# Patient Record
Sex: FEMALE | Race: WHITE | URBAN - METROPOLITAN AREA
[De-identification: names, ages, dates, MRNs, and addresses within clinical notes are randomized per-mention and may not be internally consistent; named-entity substitution may affect disease eponyms.]

---

## 2022-07-29 ENCOUNTER — OFFICE VISIT (OUTPATIENT)
Dept: FAMILY MEDICINE CLINIC | Age: 27
End: 2022-07-29
Payer: COMMERCIAL

## 2022-07-29 ENCOUNTER — TELEPHONE (OUTPATIENT)
Dept: FAMILY MEDICINE CLINIC | Age: 27
End: 2022-07-29

## 2022-07-29 VITALS
HEIGHT: 62 IN | OXYGEN SATURATION: 99 % | HEART RATE: 84 BPM | WEIGHT: 207 LBS | BODY MASS INDEX: 38.09 KG/M2 | SYSTOLIC BLOOD PRESSURE: 110 MMHG | DIASTOLIC BLOOD PRESSURE: 70 MMHG

## 2022-07-29 DIAGNOSIS — Z76.89 ENCOUNTER TO ESTABLISH CARE: Primary | ICD-10-CM

## 2022-07-29 DIAGNOSIS — F41.1 GAD (GENERALIZED ANXIETY DISORDER): ICD-10-CM

## 2022-07-29 DIAGNOSIS — E66.9 CLASS 2 OBESITY WITHOUT SERIOUS COMORBIDITY WITH BODY MASS INDEX (BMI) OF 38.0 TO 38.9 IN ADULT, UNSPECIFIED OBESITY TYPE: ICD-10-CM

## 2022-07-29 DIAGNOSIS — J45.990 EXERCISE-INDUCED ASTHMA: ICD-10-CM

## 2022-07-29 PROBLEM — E66.812 CLASS 2 OBESITY WITHOUT SERIOUS COMORBIDITY WITH BODY MASS INDEX (BMI) OF 38.0 TO 38.9 IN ADULT: Status: ACTIVE | Noted: 2022-07-29

## 2022-07-29 PROCEDURE — 1036F TOBACCO NON-USER: CPT | Performed by: PHYSICIAN ASSISTANT

## 2022-07-29 PROCEDURE — 99203 OFFICE O/P NEW LOW 30 MIN: CPT | Performed by: PHYSICIAN ASSISTANT

## 2022-07-29 PROCEDURE — G8427 DOCREV CUR MEDS BY ELIG CLIN: HCPCS | Performed by: PHYSICIAN ASSISTANT

## 2022-07-29 PROCEDURE — G8419 CALC BMI OUT NRM PARAM NOF/U: HCPCS | Performed by: PHYSICIAN ASSISTANT

## 2022-07-29 RX ORDER — PANTOPRAZOLE SODIUM 20 MG/1
20 TABLET, DELAYED RELEASE ORAL DAILY
COMMUNITY

## 2022-07-29 RX ORDER — NORETHINDRONE ACETATE AND ETHINYL ESTRADIOL AND FERROUS FUMARATE 1MG-20(24)
KIT ORAL
COMMUNITY

## 2022-07-29 RX ORDER — CITALOPRAM 10 MG/1
10 TABLET ORAL DAILY
Qty: 30 TABLET | Refills: 5 | Status: SHIPPED | OUTPATIENT
Start: 2022-07-29 | End: 2022-09-15

## 2022-07-29 SDOH — ECONOMIC STABILITY: FOOD INSECURITY: WITHIN THE PAST 12 MONTHS, THE FOOD YOU BOUGHT JUST DIDN'T LAST AND YOU DIDN'T HAVE MONEY TO GET MORE.: NEVER TRUE

## 2022-07-29 SDOH — ECONOMIC STABILITY: HOUSING INSECURITY
IN THE LAST 12 MONTHS, WAS THERE A TIME WHEN YOU DID NOT HAVE A STEADY PLACE TO SLEEP OR SLEPT IN A SHELTER (INCLUDING NOW)?: NO

## 2022-07-29 SDOH — ECONOMIC STABILITY: HOUSING INSECURITY: IN THE LAST 12 MONTHS, HOW MANY PLACES HAVE YOU LIVED?: 0

## 2022-07-29 SDOH — ECONOMIC STABILITY: TRANSPORTATION INSECURITY
IN THE PAST 12 MONTHS, HAS THE LACK OF TRANSPORTATION KEPT YOU FROM MEDICAL APPOINTMENTS OR FROM GETTING MEDICATIONS?: NO

## 2022-07-29 SDOH — ECONOMIC STABILITY: FOOD INSECURITY: WITHIN THE PAST 12 MONTHS, YOU WORRIED THAT YOUR FOOD WOULD RUN OUT BEFORE YOU GOT MONEY TO BUY MORE.: NEVER TRUE

## 2022-07-29 SDOH — ECONOMIC STABILITY: TRANSPORTATION INSECURITY
IN THE PAST 12 MONTHS, HAS LACK OF TRANSPORTATION KEPT YOU FROM MEETINGS, WORK, OR FROM GETTING THINGS NEEDED FOR DAILY LIVING?: NO

## 2022-07-29 SDOH — ECONOMIC STABILITY: INCOME INSECURITY: IN THE LAST 12 MONTHS, WAS THERE A TIME WHEN YOU WERE NOT ABLE TO PAY THE MORTGAGE OR RENT ON TIME?: NO

## 2022-07-29 ASSESSMENT — PATIENT HEALTH QUESTIONNAIRE - PHQ9
4. FEELING TIRED OR HAVING LITTLE ENERGY: 0
7. TROUBLE CONCENTRATING ON THINGS, SUCH AS READING THE NEWSPAPER OR WATCHING TELEVISION: 0
SUM OF ALL RESPONSES TO PHQ QUESTIONS 1-9: 0
3. TROUBLE FALLING OR STAYING ASLEEP: 0
SUM OF ALL RESPONSES TO PHQ QUESTIONS 1-9: 0
DEPRESSION UNABLE TO ASSESS: FUNCTIONAL CAPACITY MOTIVATION LIMITS ACCURACY
SUM OF ALL RESPONSES TO PHQ9 QUESTIONS 1 & 2: 0
8. MOVING OR SPEAKING SO SLOWLY THAT OTHER PEOPLE COULD HAVE NOTICED. OR THE OPPOSITE, BEING SO FIGETY OR RESTLESS THAT YOU HAVE BEEN MOVING AROUND A LOT MORE THAN USUAL: 0
2. FEELING DOWN, DEPRESSED OR HOPELESS: 0
SUM OF ALL RESPONSES TO PHQ QUESTIONS 1-9: 0
9. THOUGHTS THAT YOU WOULD BE BETTER OFF DEAD, OR OF HURTING YOURSELF: 0
SUM OF ALL RESPONSES TO PHQ QUESTIONS 1-9: 0
1. LITTLE INTEREST OR PLEASURE IN DOING THINGS: 0
10. IF YOU CHECKED OFF ANY PROBLEMS, HOW DIFFICULT HAVE THESE PROBLEMS MADE IT FOR YOU TO DO YOUR WORK, TAKE CARE OF THINGS AT HOME, OR GET ALONG WITH OTHER PEOPLE: 0
5. POOR APPETITE OR OVEREATING: 0
6. FEELING BAD ABOUT YOURSELF - OR THAT YOU ARE A FAILURE OR HAVE LET YOURSELF OR YOUR FAMILY DOWN: 0

## 2022-07-29 ASSESSMENT — ENCOUNTER SYMPTOMS
CONSTIPATION: 0
WHEEZING: 0
COUGH: 0
NAUSEA: 0
BLOOD IN STOOL: 0
DIARRHEA: 0
SHORTNESS OF BREATH: 0
ABDOMINAL PAIN: 0

## 2022-07-29 ASSESSMENT — SOCIAL DETERMINANTS OF HEALTH (SDOH): HOW HARD IS IT FOR YOU TO PAY FOR THE VERY BASICS LIKE FOOD, HOUSING, MEDICAL CARE, AND HEATING?: NOT HARD AT ALL

## 2022-07-29 NOTE — PROGRESS NOTES
Georges Iqbal (:  1995) is a 27 y.o. female,New patient, here for evaluation of the following chief complaint(s):  New Patient and Anxiety         ASSESSMENT/PLAN:  1. Encounter to establish care  2. SILVINA (generalized anxiety disorder)  -     citalopram (CELEXA) 10 MG tablet; Take 1 tablet by mouth in the morning., Disp-30 tablet, R-5Normal  -    uncontrolled, medication risks, benefits and side effects discussed with patient. She is interested in following up with psychology and will get established with Dr. Brianda Fong. Return for New Pt appt with Dr. Brianda Fong, next appointment with me in 4 weeks. Subjective   SUBJECTIVE/OBJECTIVE:  HPI  Anxiety:  Current medication: none  Has previously been on zoloft which made her vomit and one other medication that she doesn't remember  Symptoms are acute on chronic, worsening over the last four years  Current symptoms: anxiety  Denies: panic attacks, insomnia, dysphoric mood, irritability. SI/HI  Denies any past hospitalization  Has never seen a therapist    PAP: last month, [facility]  Lab work completed there as well    Exercise induced asthma  Has not needed an inhaler in over a year but does have one on hand    Review of Systems   Constitutional:  Negative for diaphoresis, fatigue and unexpected weight change. Respiratory:  Negative for cough, shortness of breath and wheezing. Cardiovascular:  Negative for chest pain and palpitations. Gastrointestinal:  Negative for abdominal pain, blood in stool, constipation, diarrhea and nausea. Genitourinary:  Negative for hematuria and menstrual problem. Neurological:  Negative for dizziness, light-headedness and headaches. Psychiatric/Behavioral:  Negative for agitation, behavioral problems, confusion, decreased concentration, dysphoric mood, hallucinations, self-injury, sleep disturbance and suicidal ideas. The patient is nervous/anxious. The patient is not hyperactive. Objective   Physical Exam  Vitals reviewed. Constitutional:       Appearance: Normal appearance. She is obese. HENT:      Head: Normocephalic and atraumatic. Cardiovascular:      Rate and Rhythm: Normal rate and regular rhythm. Heart sounds: Normal heart sounds. Pulmonary:      Effort: Pulmonary effort is normal.      Breath sounds: Normal breath sounds. Abdominal:      General: Bowel sounds are normal.      Palpations: Abdomen is soft. Tenderness: There is no abdominal tenderness. Musculoskeletal:      Right lower leg: No edema. Left lower leg: No edema. Neurological:      Mental Status: She is alert and oriented to person, place, and time. Cranial Nerves: No cranial nerve deficit. Psychiatric:         Attention and Perception: Attention and perception normal.         Mood and Affect: Affect normal. Mood is anxious. Speech: Speech normal.         Behavior: Behavior normal. Behavior is cooperative. Thought Content: Thought content normal.         Cognition and Memory: Cognition and memory normal.         Judgment: Judgment normal.                An electronic signature was used to authenticate this note.     --MEGHAN Ferrell

## 2022-07-29 NOTE — TELEPHONE ENCOUNTER
Yes, they were supposed to schedule this before she left, please get her scheduled with Dr. Nicole Dugan

## 2022-07-29 NOTE — TELEPHONE ENCOUNTER
----- Message from Pau Griggs sent at 7/29/2022 11:39 AM EDT -----  Subject: Message to Provider    QUESTIONS  Information for Provider? Patient would like the phone number/information   to schedule a phycologist appointment. She stated she just left the   office.  Please follow up.  ---------------------------------------------------------------------------  --------------  Yumi ALEXANDER  [phone]; OK to leave message on voicemail  ---------------------------------------------------------------------------  --------------  SCRIPT ANSWERS  undefined

## 2022-09-15 ENCOUNTER — OFFICE VISIT (OUTPATIENT)
Dept: FAMILY MEDICINE CLINIC | Age: 27
End: 2022-09-15
Payer: COMMERCIAL

## 2022-09-15 VITALS
TEMPERATURE: 99 F | SYSTOLIC BLOOD PRESSURE: 124 MMHG | OXYGEN SATURATION: 98 % | HEART RATE: 78 BPM | WEIGHT: 211 LBS | DIASTOLIC BLOOD PRESSURE: 76 MMHG | BODY MASS INDEX: 38.83 KG/M2 | HEIGHT: 62 IN

## 2022-09-15 DIAGNOSIS — F41.1 GAD (GENERALIZED ANXIETY DISORDER): Primary | ICD-10-CM

## 2022-09-15 PROCEDURE — G8417 CALC BMI ABV UP PARAM F/U: HCPCS | Performed by: PHYSICIAN ASSISTANT

## 2022-09-15 PROCEDURE — 1036F TOBACCO NON-USER: CPT | Performed by: PHYSICIAN ASSISTANT

## 2022-09-15 PROCEDURE — G8427 DOCREV CUR MEDS BY ELIG CLIN: HCPCS | Performed by: PHYSICIAN ASSISTANT

## 2022-09-15 PROCEDURE — 99213 OFFICE O/P EST LOW 20 MIN: CPT | Performed by: PHYSICIAN ASSISTANT

## 2022-09-15 RX ORDER — CITALOPRAM 20 MG/1
20 TABLET ORAL DAILY
Qty: 90 TABLET | Refills: 1 | Status: SHIPPED | OUTPATIENT
Start: 2022-09-15

## 2022-09-15 RX ORDER — BUSPIRONE HYDROCHLORIDE 10 MG/1
10 TABLET ORAL 2 TIMES DAILY PRN
Qty: 60 TABLET | Refills: 0 | Status: SHIPPED | OUTPATIENT
Start: 2022-09-15 | End: 2022-10-10

## 2022-09-15 ASSESSMENT — PATIENT HEALTH QUESTIONNAIRE - PHQ9
SUM OF ALL RESPONSES TO PHQ QUESTIONS 1-9: 1
3. TROUBLE FALLING OR STAYING ASLEEP: 0
8. MOVING OR SPEAKING SO SLOWLY THAT OTHER PEOPLE COULD HAVE NOTICED. OR THE OPPOSITE, BEING SO FIGETY OR RESTLESS THAT YOU HAVE BEEN MOVING AROUND A LOT MORE THAN USUAL: 0
1. LITTLE INTEREST OR PLEASURE IN DOING THINGS: 0
SUM OF ALL RESPONSES TO PHQ QUESTIONS 1-9: 1
6. FEELING BAD ABOUT YOURSELF - OR THAT YOU ARE A FAILURE OR HAVE LET YOURSELF OR YOUR FAMILY DOWN: 0
SUM OF ALL RESPONSES TO PHQ9 QUESTIONS 1 & 2: 0
7. TROUBLE CONCENTRATING ON THINGS, SUCH AS READING THE NEWSPAPER OR WATCHING TELEVISION: 0
SUM OF ALL RESPONSES TO PHQ QUESTIONS 1-9: 1
9. THOUGHTS THAT YOU WOULD BE BETTER OFF DEAD, OR OF HURTING YOURSELF: 0
2. FEELING DOWN, DEPRESSED OR HOPELESS: 0
5. POOR APPETITE OR OVEREATING: 1
SUM OF ALL RESPONSES TO PHQ QUESTIONS 1-9: 1
4. FEELING TIRED OR HAVING LITTLE ENERGY: 0
10. IF YOU CHECKED OFF ANY PROBLEMS, HOW DIFFICULT HAVE THESE PROBLEMS MADE IT FOR YOU TO DO YOUR WORK, TAKE CARE OF THINGS AT HOME, OR GET ALONG WITH OTHER PEOPLE: 0

## 2022-09-15 ASSESSMENT — ANXIETY QUESTIONNAIRES
1. FEELING NERVOUS, ANXIOUS, OR ON EDGE: 3
3. WORRYING TOO MUCH ABOUT DIFFERENT THINGS: 3
4. TROUBLE RELAXING: 2
IF YOU CHECKED OFF ANY PROBLEMS ON THIS QUESTIONNAIRE, HOW DIFFICULT HAVE THESE PROBLEMS MADE IT FOR YOU TO DO YOUR WORK, TAKE CARE OF THINGS AT HOME, OR GET ALONG WITH OTHER PEOPLE: EXTREMELY DIFFICULT
6. BECOMING EASILY ANNOYED OR IRRITABLE: 1
GAD7 TOTAL SCORE: 15
7. FEELING AFRAID AS IF SOMETHING AWFUL MIGHT HAPPEN: 3
5. BEING SO RESTLESS THAT IT IS HARD TO SIT STILL: 0
2. NOT BEING ABLE TO STOP OR CONTROL WORRYING: 3

## 2022-09-15 ASSESSMENT — ENCOUNTER SYMPTOMS
VOMITING: 0
NAUSEA: 0

## 2022-09-15 NOTE — PROGRESS NOTES
Abdi Stokes (:  1995) is a 24 y.o. female,Established patient, here for evaluation of the following chief complaint(s):  Medication Check         ASSESSMENT/PLAN:  1. SILVINA (generalized anxiety disorder)  -     citalopram (CELEXA) 20 MG tablet; Take 1 tablet by mouth daily, Disp-90 tablet, R-1Normal  -     busPIRone (BUSPAR) 10 MG tablet; Take 1 tablet by mouth 2 times daily as needed (anxiety), Disp-60 tablet, R-0Normal  -    improved but not well enough controlled, will increase celexa to 20 mg and add on buspar prn. Return in about 6 weeks (around 10/27/2022) for Mood. Subjective   SUBJECTIVE/OBJECTIVE:  HPI  SILVINA:  Current medication: celexa  Side effects: loss of appetite and fatigue  Residual symptoms: excessive worry about leaving kids at school, anxiety while driving  Denies: dysphoric symptoms, irritability  She has noticed some enjoyment back in her daily life, continues to struggle with excessive worry. Interested in starting a prn medication    Pt agreeable to prevnar but we are out of this vaccine, will get at her next appointment    Review of Systems   Constitutional:  Negative for activity change, appetite change and fatigue. Gastrointestinal:  Negative for nausea and vomiting. Psychiatric/Behavioral:  Negative for agitation, behavioral problems, confusion, decreased concentration, dysphoric mood, hallucinations, self-injury, sleep disturbance and suicidal ideas. The patient is nervous/anxious. The patient is not hyperactive. Objective   Physical Exam  Vitals reviewed. Constitutional:       Appearance: Normal appearance. Neurological:      Mental Status: She is alert and oriented to person, place, and time. Cranial Nerves: No cranial nerve deficit. Psychiatric:         Attention and Perception: Attention and perception normal.         Mood and Affect: Affect normal. Mood is anxious.          Speech: Speech normal.         Behavior: Behavior normal. Behavior is cooperative. Thought Content: Thought content normal.         Cognition and Memory: Cognition and memory normal.         Judgment: Judgment normal.                An electronic signature was used to authenticate this note.     --MEGHAN Root

## 2022-10-09 DIAGNOSIS — F41.1 GAD (GENERALIZED ANXIETY DISORDER): ICD-10-CM

## 2022-10-10 RX ORDER — BUSPIRONE HYDROCHLORIDE 10 MG/1
10 TABLET ORAL 2 TIMES DAILY PRN
Qty: 180 TABLET | Refills: 1 | Status: SHIPPED | OUTPATIENT
Start: 2022-10-10 | End: 2022-11-09

## 2022-10-10 NOTE — TELEPHONE ENCOUNTER
.Refill Request     CONFIRM preferrred pharmacy with the patient. If Mail Order Rx - Pend for 90 day refill. Last Seen: Last Seen Department: 9/15/2022  Last Seen by PCP: 9/15/2022    Last Written: 9-15-22 60 with 0     If no future appointment scheduled, route STAFF MESSAGE with patient name to the [facility] for scheduling. Next Appointment:   Future Appointments   Date Time Provider Risa Solorzano   10/31/2022  4:15 PM MEGHAN Harris - [facility]       Message sent to [address] [place] [address] to schedule appt with patient? N/A      Requested Prescriptions     Pending Prescriptions Disp Refills    busPIRone (BUSPAR) 10 MG tablet [Pharmacy Med Name: BUSPIRONE HCL 10 MG TABLET] 60 tablet 0     Sig: TAKE 1 TABLET BY MOUTH 2 TIMES DAILY AS NEEDED (ANXIETY).

## 2022-10-17 ENCOUNTER — TELEPHONE (OUTPATIENT)
Dept: FAMILY MEDICINE CLINIC | Age: 27
End: 2022-10-17

## 2022-10-17 NOTE — TELEPHONE ENCOUNTER
Pt is not currently pregnant but she is trying to become pregnant. Pt wants to know if Celexa, Buspar, and pantoprazole are ok to take while trying to conceive. She has stopped the birth control.

## 2022-11-21 DIAGNOSIS — F41.1 GAD (GENERALIZED ANXIETY DISORDER): ICD-10-CM

## 2022-11-21 RX ORDER — CITALOPRAM 10 MG/1
TABLET ORAL
Qty: 90 TABLET | Refills: 1 | OUTPATIENT
Start: 2022-11-21

## 2022-11-21 NOTE — TELEPHONE ENCOUNTER
.Refill Request     CONFIRM preferrred pharmacy with the patient. If Mail Order Rx - Pend for 90 day refill. Last Seen: Last Seen Department: 9/15/2022  Last Seen by PCP: 9/15/2022    Last Written: 9-15-22 90 with 1     If no future appointment scheduled, route STAFF MESSAGE with patient name to the [facility] for scheduling. Next Appointment:   No future appointments. Message sent to [address] [place] [address] to schedule appt with patient?   YES      Requested Prescriptions     Pending Prescriptions Disp Refills    citalopram (CELEXA) 10 MG tablet [Pharmacy Med Name: CITALOPRAM HBR 10 MG TABLET] 90 tablet 1     Sig: TAKE 1 TABLET BY MOUTH EVERY DAY IN THE MORNING

## 2023-03-10 DIAGNOSIS — F41.1 GAD (GENERALIZED ANXIETY DISORDER): ICD-10-CM

## 2023-03-10 RX ORDER — CITALOPRAM 20 MG/1
TABLET ORAL
Qty: 90 TABLET | Refills: 1 | Status: SHIPPED | OUTPATIENT
Start: 2023-03-10

## 2023-03-10 NOTE — TELEPHONE ENCOUNTER
Refill Request     CONFIRM preferrred pharmacy with the patient. If Mail Order Rx - Pend for 90 day refill. Last Seen: Last Seen Department: 9/15/2022  Last Seen by PCP: 9/15/2022    Last Written: 09/15/2022 90 tablet 1 refills     If no future appointment scheduled, route STAFF MESSAGE with patient name to the [facility] for scheduling. Next Appointment:   No future appointments. Message sent to [address] [place] [address] to schedule appt with patient? YES  Return in about 6 weeks (around 10/27/2022) for Mood.     Requested Prescriptions     Pending Prescriptions Disp Refills    citalopram (CELEXA) 20 MG tablet [Pharmacy Med Name: CITALOPRAM HBR 20 MG TABLET] 90 tablet 1     Sig: TAKE 1 TABLET BY MOUTH EVERY DAY

## 2023-04-07 ENCOUNTER — APPOINTMENT (OUTPATIENT)
Dept: ULTRASOUND IMAGING | Age: 28
End: 2023-04-07
Payer: COMMERCIAL

## 2023-04-07 ENCOUNTER — HOSPITAL ENCOUNTER (EMERGENCY)
Age: 28
Discharge: HOME | End: 2023-04-07
Payer: COMMERCIAL

## 2023-04-07 VITALS
WEIGHT: 215 LBS | TEMPERATURE: 98 F | BODY MASS INDEX: 39.97 KG/M2 | RESPIRATION RATE: 18 BRPM | OXYGEN SATURATION: 99 % | DIASTOLIC BLOOD PRESSURE: 70 MMHG | HEART RATE: 85 BPM | SYSTOLIC BLOOD PRESSURE: 137 MMHG

## 2023-04-07 DIAGNOSIS — N76.0 BACTERIAL VAGINOSIS: ICD-10-CM

## 2023-04-07 DIAGNOSIS — B96.89 BACTERIAL VAGINOSIS: ICD-10-CM

## 2023-04-07 DIAGNOSIS — O46.90 VAGINAL BLEEDING IN PREGNANCY: Primary | ICD-10-CM

## 2023-04-07 LAB
ABO + RH BLD: NORMAL
AMORPH SED URNS QL MICRO: ABNORMAL /HPF
ANION GAP SERPL CALCULATED.3IONS-SCNC: 12 MMOL/L (ref 3–16)
B-HCG SERPL EIA 3RD IS-ACNC: NORMAL MIU/ML
BACTERIA GENITAL QL WET PREP: ABNORMAL
BACTERIA URNS QL MICRO: ABNORMAL /HPF
BILIRUB UR QL STRIP.AUTO: NEGATIVE
BUN SERPL-MCNC: 10 MG/DL (ref 7–20)
CALCIUM SERPL-MCNC: 9.1 MG/DL (ref 8.3–10.6)
CHLORIDE SERPL-SCNC: 100 MMOL/L (ref 99–110)
CLARITY UR: ABNORMAL
CLUE CELLS SPEC QL WET PREP: ABNORMAL
CO2 SERPL-SCNC: 22 MMOL/L (ref 21–32)
COLOR UR: YELLOW
CREAT SERPL-MCNC: <0.5 MG/DL (ref 0.6–1.1)
DEPRECATED RDW RBC AUTO: 13.5 % (ref 12.4–15.4)
EPI CELLS #/AREA URNS HPF: ABNORMAL /HPF (ref 0–5)
EPI CELLS SPEC QL WET PREP: ABNORMAL
GFR SERPLBLD CREATININE-BSD FMLA CKD-EPI: >60 ML/MIN/{1.73_M2}
GLUCOSE SERPL-MCNC: 97 MG/DL (ref 70–99)
GLUCOSE UR STRIP.AUTO-MCNC: NEGATIVE MG/DL
HCT VFR BLD AUTO: 38.1 % (ref 36–48)
HGB BLD-MCNC: 12.8 G/DL (ref 12–16)
HGB UR QL STRIP.AUTO: ABNORMAL
KETONES UR STRIP.AUTO-MCNC: NEGATIVE MG/DL
LEUKOCYTE ESTERASE UR QL STRIP.AUTO: ABNORMAL
MCH RBC QN AUTO: 30.7 PG (ref 26–34)
MCHC RBC AUTO-ENTMCNC: 33.7 G/DL (ref 31–36)
MCV RBC AUTO: 91.1 FL (ref 80–100)
NITRITE UR QL STRIP.AUTO: NEGATIVE
PH UR STRIP.AUTO: 7.5 [PH] (ref 5–8)
PLATELET # BLD AUTO: 231 K/UL (ref 135–450)
PMV BLD AUTO: 7.9 FL (ref 5–10.5)
POTASSIUM SERPL-SCNC: 3.8 MMOL/L (ref 3.5–5.1)
PROT UR STRIP.AUTO-MCNC: NEGATIVE MG/DL
RBC # BLD AUTO: 4.18 M/UL (ref 4–5.2)
RBC #/AREA URNS HPF: ABNORMAL /HPF (ref 0–4)
RBC SPEC QL WET PREP: ABNORMAL
SODIUM SERPL-SCNC: 134 MMOL/L (ref 136–145)
SP GR UR STRIP.AUTO: 1.01 (ref 1–1.03)
SPECIMEN SOURCE FLD: ABNORMAL
T VAGINALIS GENITAL QL WET PREP: ABNORMAL
UA COMPLETE W REFLEX CULTURE PNL UR: ABNORMAL
UA DIPSTICK W REFLEX MICRO PNL UR: YES
URN SPEC COLLECT METH UR: ABNORMAL
UROBILINOGEN UR STRIP-ACNC: 0.2 E.U./DL
WBC # BLD AUTO: 9.6 K/UL (ref 4–11)
WBC #/AREA URNS HPF: ABNORMAL /HPF (ref 0–5)
WBC SPEC QL WET PREP: ABNORMAL
YEAST GENITAL QL WET PREP: ABNORMAL

## 2023-04-07 PROCEDURE — 76817 TRANSVAGINAL US OBSTETRIC: CPT

## 2023-04-07 PROCEDURE — 86901 BLOOD TYPING SEROLOGIC RH(D): CPT

## 2023-04-07 PROCEDURE — 87591 N.GONORRHOEAE DNA AMP PROB: CPT

## 2023-04-07 PROCEDURE — 80048 BASIC METABOLIC PNL TOTAL CA: CPT

## 2023-04-07 PROCEDURE — 87491 CHLMYD TRACH DNA AMP PROBE: CPT

## 2023-04-07 PROCEDURE — 99284 EMERGENCY DEPT VISIT MOD MDM: CPT

## 2023-04-07 PROCEDURE — 86900 BLOOD TYPING SEROLOGIC ABO: CPT

## 2023-04-07 PROCEDURE — 81001 URINALYSIS AUTO W/SCOPE: CPT

## 2023-04-07 PROCEDURE — 84702 CHORIONIC GONADOTROPIN TEST: CPT

## 2023-04-07 PROCEDURE — 87210 SMEAR WET MOUNT SALINE/INK: CPT

## 2023-04-07 PROCEDURE — 85027 COMPLETE CBC AUTOMATED: CPT

## 2023-04-07 ASSESSMENT — ENCOUNTER SYMPTOMS
VOMITING: 0
SINUS PRESSURE: 0
COUGH: 0
SHORTNESS OF BREATH: 0
ABDOMINAL PAIN: 0
CONSTIPATION: 0
DIARRHEA: 0
RHINORRHEA: 0
CHEST TIGHTNESS: 0
NAUSEA: 0
SORE THROAT: 0
SINUS PAIN: 0
EYE REDNESS: 0
EYE DISCHARGE: 0

## 2023-04-07 ASSESSMENT — PAIN - FUNCTIONAL ASSESSMENT: PAIN_FUNCTIONAL_ASSESSMENT: 0-10

## 2023-04-07 ASSESSMENT — PAIN SCALES - GENERAL: PAINLEVEL_OUTOF10: 0

## 2023-04-07 NOTE — Clinical Note
Alem Almendarez was seen and treated in our emergency department on 4/7/2023. She may return to work on . PLEASE PROVIDE LIGHT DUTY AS DESCRIBED AS A SAFE WORKING ENVIRONMENT, WHERE THERE IS NO RISK FOR TRAUMA TO ABDOMEN OR PELVIS. If you have any questions or concerns, please don't hesitate to call.       Dee Rene PA-C

## 2023-04-07 NOTE — Clinical Note
Yuridia Andrade was seen and treated in our emergency department on 4/7/2023. She may return to work on . PLEASE PROVIDE LIGHT DUTY AS DESCRIBED AS A SAFE WORKING ENVIRONMENT, WHERE THERE IS NO RISK FOR TRAUMA TO ABDOMEN OR PELVIS. If you have any questions or concerns, please don't hesitate to call.       Katerine SenderMOISES

## 2023-04-07 NOTE — Clinical Note
Celi Posey was seen and treated in our emergency department on 4/7/2023. She may return to work on . PLEASE PROVIDE LIGHT DUTY AS DESCRIBED AS A SAFE WORKING ENVIRONMENT, WHERE THERE IS NO RISK FOR TRAUMA TO ABDOMEN OR PELVIS. If you have any questions or concerns, please don't hesitate to call.       Micky Nj PA-C

## 2023-04-07 NOTE — ED PROVIDER NOTES
WISDOM TOOTH EXTRACTION         CURRENTMEDICATIONS       Discharge Medication List as of 4/7/2023  3:06 PM        CONTINUE these medications which have NOT CHANGED    Details   citalopram (CELEXA) 20 MG tablet TAKE 1 TABLET BY MOUTH EVERY DAY, Disp-90 tablet, R-1Normal      Norethin Ace-Eth Estrad-FE 1-20 MG-MCG(24) TABS Take by mouthHistorical Med      pantoprazole (PROTONIX) 20 MG tablet Take 20 mg by mouth in the morning. Historical Med             ALLERGIES     Pollen extract and Sertraline    FAMILYHISTORY       Family History   Problem Relation Age of Onset    Cancer Maternal Grandmother     Cancer Maternal Grandfather         kidney cancer    Prostate Cancer Paternal Grandmother         SOCIAL HISTORY       Social History     Tobacco Use    Smoking status: Never    Smokeless tobacco: Never   Vaping Use    Vaping Use: Never used   Substance Use Topics    Alcohol use: Yes     Comment: one time per month    Drug use: No       SCREENINGS                         CIWA Assessment  BP: 137/70  Heart Rate: 85           PHYSICAL EXAM  1 or more Elements     ED Triage Vitals [04/07/23 1111]   BP Temp Temp Source Heart Rate Resp SpO2 Height Weight   137/70 98 °F (36.7 °C) Oral 85 18 99 % -- 215 lb (97.5 kg)       Physical Exam  Vitals and nursing note reviewed. Exam conducted with a chaperone present. Constitutional:       Appearance: Normal appearance. She is well-developed. She is obese. She is not diaphoretic. HENT:      Head: Normocephalic and atraumatic. Nose: Nose normal.      Mouth/Throat:      Mouth: Mucous membranes are moist.      Pharynx: No oropharyngeal exudate. Eyes:      General:         Right eye: No discharge. Left eye: No discharge. Extraocular Movements: Extraocular movements intact. Conjunctiva/sclera: Conjunctivae normal.      Pupils: Pupils are equal, round, and reactive to light. Cardiovascular:      Rate and Rhythm: Normal rate and regular rhythm.       Heart sounds:

## 2023-04-07 NOTE — Clinical Note
Shanice Underwood was seen and treated in our emergency department on 4/7/2023. She may return to work on 04/08/2023. If you have any questions or concerns, please don't hesitate to call.       Temitope Sol PA-C

## 2023-04-07 NOTE — Clinical Note
Connor Palma was seen and treated in our emergency department on 4/7/2023. She may return to work on 04/08/2023. If you have any questions or concerns, please don't hesitate to call.       Golden Concepcion PA-C

## 2023-04-07 NOTE — DISCHARGE INSTRUCTIONS
Please return if you have new or worsening of symptoms. Please follow-up with your OB/GYN as you have planned.

## 2023-04-07 NOTE — Clinical Note
Kurt Wilder was seen and treated in our emergency department on 4/7/2023. She may return to work on 04/08/2023. If you have any questions or concerns, please don't hesitate to call.       Katarzyna Ramos PA-C

## 2023-04-08 LAB
C TRACH DNA CVX QL NAA+PROBE: NEGATIVE
N GONORRHOEA DNA CERV MUCUS QL NAA+PROBE: NEGATIVE

## 2023-04-10 DIAGNOSIS — O46.90 VAGINAL BLEEDING IN PREGNANCY: ICD-10-CM

## 2023-04-12 PROBLEM — E66.01 CLASS 3 SEVERE OBESITY DUE TO EXCESS CALORIES WITHOUT SERIOUS COMORBIDITY WITH BODY MASS INDEX (BMI) OF 40.0 TO 44.9 IN ADULT (HCC): Status: ACTIVE | Noted: 2022-07-29

## 2023-04-12 PROBLEM — E66.813 CLASS 3 SEVERE OBESITY DUE TO EXCESS CALORIES WITHOUT SERIOUS COMORBIDITY WITH BODY MASS INDEX (BMI) OF 40.0 TO 44.9 IN ADULT: Status: ACTIVE | Noted: 2022-07-29

## 2023-04-18 ENCOUNTER — OFFICE VISIT (OUTPATIENT)
Dept: OBGYN CLINIC | Age: 28
End: 2023-04-18

## 2023-04-18 VITALS
TEMPERATURE: 97.9 F | SYSTOLIC BLOOD PRESSURE: 110 MMHG | BODY MASS INDEX: 41.56 KG/M2 | DIASTOLIC BLOOD PRESSURE: 66 MMHG | WEIGHT: 223.6 LBS | HEART RATE: 88 BPM

## 2023-04-18 DIAGNOSIS — E66.01 CLASS 3 SEVERE OBESITY DUE TO EXCESS CALORIES WITHOUT SERIOUS COMORBIDITY WITH BODY MASS INDEX (BMI) OF 40.0 TO 44.9 IN ADULT (HCC): ICD-10-CM

## 2023-04-18 DIAGNOSIS — F41.1 GAD (GENERALIZED ANXIETY DISORDER): ICD-10-CM

## 2023-04-18 DIAGNOSIS — Z11.3 SCREENING EXAMINATION FOR STD (SEXUALLY TRANSMITTED DISEASE): ICD-10-CM

## 2023-04-18 DIAGNOSIS — J45.990 EXERCISE-INDUCED ASTHMA: ICD-10-CM

## 2023-04-18 DIAGNOSIS — N85.8 UTERINE MASS: ICD-10-CM

## 2023-04-18 DIAGNOSIS — N91.2 AMENORRHEA: Primary | ICD-10-CM

## 2023-04-18 NOTE — PROGRESS NOTES
is present at 168 bpm.      IMPRESSION:    Single IUP with cardiac activity. There is an echogenic mass within the gestational sac adjacent to the fetus measuring 1.28 x 1.19 x 1.44cm. Differential diagnosis may include: organized blood clot, vanishing twin, molar pregnancy, etc.  A similar echogenic area is appreciated in prior study performed at 6 weeks gestation. Imaging is limited secondary to bowel gas. Patient is well aware of the limitations of ultrasound in the detection of anomalies. Assessment:       Diagnosis Orders   1. Amenorrhea  Culture, Urine    Urinalysis with Microscopic    C.trachomatis N.gonorrhoeae DNA    Vaginal Pathogens Probes *A      2. Screening examination for STD (sexually transmitted disease)  C.trachomatis N.gonorrhoeae DNA    Vaginal Pathogens Probes *A      3. Class 3 severe obesity due to excess calories without serious comorbidity with body mass index (BMI) of 40.0 to 44.9 in adult ([address]        4. Exercise-induced asthma        5. SILVINA (generalized anxiety disorder)        6. Uterine mass                Plan:      Orders Placed This Encounter   Procedures    Culture, Urine    C.trachomatis N.gonorrhoeae DNA    Urinalysis with Microscopic    Vaginal Pathogens Probes *A     Ultrasound result reviewed. Potential etiologies for echogenic mass reviewed. Recheck ultrasound in 2 weeks. Continue prenatal vitamins  Options for prenatal testing reviewed:  NT, quad screen, free fetal DNA. Risks and benefits discussed. Follow up prn and 2 weeks for initial prenatal visit and ultrasound.            Yanelis Urbina DO

## 2023-04-19 LAB
BACTERIA URNS QL MICRO: ABNORMAL /HPF
BILIRUB UR QL STRIP.AUTO: NEGATIVE
CALCIUM OXALATE CRYSTALS: PRESENT
CANDIDA DNA VAG QL NAA+PROBE: NORMAL
CLARITY UR: CLEAR
COLOR UR: YELLOW
EPI CELLS #/AREA URNS AUTO: 3 /HPF (ref 0–5)
G VAGINALIS DNA SPEC QL NAA+PROBE: NORMAL
GLUCOSE UR STRIP.AUTO-MCNC: NEGATIVE MG/DL
HGB UR QL STRIP.AUTO: NEGATIVE
HYALINE CASTS #/AREA URNS AUTO: 2 /LPF (ref 0–8)
KETONES UR STRIP.AUTO-MCNC: NEGATIVE MG/DL
LEUKOCYTE ESTERASE UR QL STRIP.AUTO: ABNORMAL
NITRITE UR QL STRIP.AUTO: NEGATIVE
PH UR STRIP.AUTO: 6 [PH] (ref 5–8)
PROT UR STRIP.AUTO-MCNC: NEGATIVE MG/DL
RBC CLUMPS #/AREA URNS AUTO: 1 /HPF (ref 0–4)
SP GR UR STRIP.AUTO: 1.02 (ref 1–1.03)
T VAGINALIS DNA VAG QL NAA+PROBE: NORMAL
UA DIPSTICK W REFLEX MICRO PNL UR: YES
URN SPEC COLLECT METH UR: ABNORMAL
UROBILINOGEN UR STRIP-ACNC: 0.2 E.U./DL
WBC #/AREA URNS AUTO: 5 /HPF (ref 0–5)

## 2023-04-20 PROBLEM — N85.8 UTERINE MASS: Status: ACTIVE | Noted: 2023-04-20

## 2023-04-20 LAB
BACTERIA UR CULT: NORMAL
C TRACH DNA CVX QL NAA+PROBE: NEGATIVE
N GONORRHOEA DNA CERV MUCUS QL NAA+PROBE: NEGATIVE

## 2023-04-20 ASSESSMENT — ENCOUNTER SYMPTOMS
DIARRHEA: 0
NAUSEA: 0
SHORTNESS OF BREATH: 0
ABDOMINAL PAIN: 0
ABDOMINAL DISTENTION: 0
CONSTIPATION: 0
VOMITING: 0

## 2023-05-10 ENCOUNTER — INITIAL PRENATAL (OUTPATIENT)
Dept: OBGYN CLINIC | Age: 28
End: 2023-05-10

## 2023-05-10 VITALS
BODY MASS INDEX: 41.64 KG/M2 | WEIGHT: 224 LBS | SYSTOLIC BLOOD PRESSURE: 122 MMHG | HEART RATE: 91 BPM | DIASTOLIC BLOOD PRESSURE: 84 MMHG

## 2023-05-10 DIAGNOSIS — Z34.01 ENCOUNTER FOR PRENATAL CARE IN FIRST TRIMESTER OF FIRST PREGNANCY: Primary | ICD-10-CM

## 2023-05-10 DIAGNOSIS — F41.1 GAD (GENERALIZED ANXIETY DISORDER): ICD-10-CM

## 2023-05-10 DIAGNOSIS — N85.8 UTERINE MASS: ICD-10-CM

## 2023-05-10 DIAGNOSIS — E66.01 CLASS 3 SEVERE OBESITY DUE TO EXCESS CALORIES WITHOUT SERIOUS COMORBIDITY WITH BODY MASS INDEX (BMI) OF 40.0 TO 44.9 IN ADULT (HCC): ICD-10-CM

## 2023-05-10 NOTE — PROGRESS NOTES
Maternal emotional well being screening form completed and reviewed with patient.  Current score is 7

## 2023-05-11 ENCOUNTER — TELEPHONE (OUTPATIENT)
Dept: OBGYN CLINIC | Age: 28
End: 2023-05-11

## 2023-05-11 PROBLEM — Z34.01 ENCOUNTER FOR PRENATAL CARE IN FIRST TRIMESTER OF FIRST PREGNANCY: Status: ACTIVE | Noted: 2023-05-11

## 2023-05-11 LAB
ABO + RH BLD: NORMAL
AMPHETAMINES UR QL SCN>1000 NG/ML: NORMAL
BARBITURATES UR QL SCN>200 NG/ML: NORMAL
BENZODIAZ UR QL SCN>200 NG/ML: NORMAL
BLD GP AB SCN SERPL QL: NORMAL
BUPRENORPHINE+NOR UR QL SCN: NORMAL
CANNABINOIDS UR QL SCN>50 NG/ML: NORMAL
COCAINE UR QL SCN: NORMAL
DRUG SCREEN COMMENT UR-IMP: NORMAL
FENTANYL SCREEN, URINE: NORMAL
HBV SURFACE AG SERPL QL IA: NORMAL
METHADONE UR QL SCN>300 NG/ML: NORMAL
OPIATES UR QL SCN>300 NG/ML: NORMAL
OXYCODONE UR QL SCN: NORMAL
PCP UR QL SCN>25 NG/ML: NORMAL
PH UR STRIP: 6 [PH]
REASON FOR REJECTION: NORMAL
REJECTED TEST: NORMAL
RUBV IGG SERPL IA-ACNC: 492.8 IU/ML
TSH SERPL DL<=0.005 MIU/L-ACNC: 2 UIU/ML (ref 0.27–4.2)

## 2023-05-11 NOTE — TELEPHONE ENCOUNTER
CBC- Unable to perform testing; specimen clotted. To perform testing the specimen will need to be recollected.   Clotted

## 2023-05-11 NOTE — PROGRESS NOTES
25 y/o  female at 10 weeks 6 days gestation with [facility] 23 presents for initial prenatal visit and ultrasound. Pregnancy is complicated by echogenic intrauterine mass adjacent to amnion, maternal weight, exercise induced asthma, and anxiety. FDLMP: 23. Medications:  generic celexa, Pantoprazol, prenatal vitamin, and Unisom. Denies leakage of fluid, vaginal discharge and pelvic pain. Admits to 2 episodes of brown spotting since last visit. Denies headaches, vision changes and RUQ pain. Denies fever, chills, chest pain, shortness of breath, nausea, vomiting, diarrhea, constipation, dysuria and hematuria. Maternal Wellness Questionnaire reviewed--no concerns. US OB TRANSVAGINAL  Order: [id number]   Status: Final result      Visible to patient: Yes (not seen)      Next appt: 2023 at 02:20 PM in Obstetrics and Gynecology [facility] [place]      Dx: Amenorrhea      0 Result Notes  Details    Reading Physician Reading Date Result Priority   Juany Enciso 649 2023 Routine     Narrative & Impression  OBSTETRIC ULTRASOUND--1ST TRIMESTER     DATE: 05/10/2023     PHYSICIAN: MATILDE NEWBERRY      SONOGRAPHER: Nahid Luke RDMS     INDICATION: FU viability     TYPE OF SCAN:  vaginal     FINDINGS:       The cul de sac is normal.  The cervix is normal.  The uterus is gravid.     No uterine anomalies are evident. There is a single intrauterine pregnancy identified.  A fetal pole is noted with a CRL measuring 4.41 cm, consistent with gestational age of 9weeks and 4days and EDC of 2023. Larissa Greulich is a 2 day discrepancy when compared with the gestational age of 10weeks and 6days and EDC of 2023. Yolk sac is present and measures 171 cm.    Fetal cardiac activity is present at 171 bpm.      IMPRESSION:    Single IUP with cardiac activity. Echogenic structure adjacent to amnion seen again as previously noted measuring 2.6x1. 7x1.9cm.      Imaging is limited

## 2023-05-12 LAB
HCV AB S/CO SERPL IA: 0.06 IV
HCV AB SERPL QL IA: NEGATIVE
HIV 1+2 AB+HIV1 P24 AG SERPL QL IA: NORMAL
HIV 2 AB SERPL QL IA: NORMAL
HIV1 AB SERPL QL IA: NORMAL
HIV1 P24 AG SERPL QL IA: NORMAL
REAGIN+T PALLIDUM IGG+IGM SERPL-IMP: NORMAL
VZV IGG SER QL IA: NORMAL

## 2023-05-25 ENCOUNTER — ROUTINE PRENATAL (OUTPATIENT)
Dept: OBGYN CLINIC | Age: 28
End: 2023-05-25

## 2023-05-25 VITALS
DIASTOLIC BLOOD PRESSURE: 89 MMHG | HEART RATE: 92 BPM | BODY MASS INDEX: 41.27 KG/M2 | WEIGHT: 222 LBS | SYSTOLIC BLOOD PRESSURE: 120 MMHG

## 2023-05-25 DIAGNOSIS — J45.990 EXERCISE-INDUCED ASTHMA: ICD-10-CM

## 2023-05-25 DIAGNOSIS — N85.8 UTERINE MASS: ICD-10-CM

## 2023-05-25 DIAGNOSIS — Z34.01 ENCOUNTER FOR PRENATAL CARE IN FIRST TRIMESTER OF FIRST PREGNANCY: ICD-10-CM

## 2023-05-25 DIAGNOSIS — Z34.01 ENCOUNTER FOR SUPERVISION OF NORMAL FIRST PREGNANCY IN FIRST TRIMESTER: Primary | ICD-10-CM

## 2023-05-25 DIAGNOSIS — E66.01 CLASS 3 SEVERE OBESITY DUE TO EXCESS CALORIES WITHOUT SERIOUS COMORBIDITY WITH BODY MASS INDEX (BMI) OF 40.0 TO 44.9 IN ADULT (HCC): ICD-10-CM

## 2023-05-25 NOTE — PROGRESS NOTES
Maternal emotional well being screening form completed and reviewed with patient. Current score is 7. Patient given referral to [address] [place] [address] ([phone]):  No

## 2023-05-26 LAB
BASOPHILS # BLD: 0.1 K/UL (ref 0–0.2)
BASOPHILS NFR BLD: 0.6 %
DEPRECATED RDW RBC AUTO: 13.6 % (ref 12.4–15.4)
EOSINOPHIL # BLD: 0.1 K/UL (ref 0–0.6)
EOSINOPHIL NFR BLD: 0.8 %
HCT VFR BLD AUTO: 38.3 % (ref 36–48)
HGB BLD-MCNC: 12.6 G/DL (ref 12–16)
LYMPHOCYTES # BLD: 2.8 K/UL (ref 1–5.1)
LYMPHOCYTES NFR BLD: 29.1 %
MCH RBC QN AUTO: 30.3 PG (ref 26–34)
MCHC RBC AUTO-ENTMCNC: 33 G/DL (ref 31–36)
MCV RBC AUTO: 91.8 FL (ref 80–100)
MONOCYTES # BLD: 0.6 K/UL (ref 0–1.3)
MONOCYTES NFR BLD: 6 %
NEUTROPHILS # BLD: 6.2 K/UL (ref 1.7–7.7)
NEUTROPHILS NFR BLD: 63.5 %
PLATELET # BLD AUTO: 214 K/UL (ref 135–450)
PMV BLD AUTO: 9.1 FL (ref 5–10.5)
RBC # BLD AUTO: 4.17 M/UL (ref 4–5.2)
WBC # BLD AUTO: 9.8 K/UL (ref 4–11)

## 2023-05-27 NOTE — PROGRESS NOTES
25 y/o  female at 13 weeks 0 days gestation with [facility] 23 presents for prenatal visit and ultrasound secondary to uterine mass (within gestational sac). Has appointment with M next Friday to evaluate mass. Pregnancy is complicated by echogenic intrauterine mass adjacent to amnion, maternal weight, exercise induced asthma, and anxiety. FDLMP: 23. Medications:  generic celexa, Pantoprazol, prenatal vitamin, and Unisom. Denies vaginal bleeding, leakage of fluid, vaginal discharge and pelvic pain. Admits to some cramping. Denies headaches, vision changes and RUQ pain. Denies fever, chills, chest pain, shortness of breath, nausea, vomiting, diarrhea, constipation, dysuria and hematuria. Maternal Wellness Questionnaire reviewed--no concerns. Diagnosis Orders   1. Encounter for supervision of normal first pregnancy in first trimester  CBC with Auto Differential      2. Encounter for prenatal care in first trimester of first pregnancy        3. Class 3 severe obesity due to excess calories without serious comorbidity with body mass index (BMI) of 40.0 to 44.9 in adult ([address]        4. Uterine mass        5. Exercise-induced asthma          Materni T21  Limited ultrasound today--uterine mass not seen today--limited views. Patient to move MFM appointment to later day for level 2 anatomy screen (for BMI). In interim, will recheck/confirm resolution of uterine mass with next visit.    Follow up prn and 4 weeks for prenatal visit and ultrasound

## 2023-06-05 ENCOUNTER — TELEPHONE (OUTPATIENT)
Dept: OBGYN CLINIC | Age: 28
End: 2023-06-05

## 2023-06-05 NOTE — TELEPHONE ENCOUNTER
Patient calling to report spotting over the weekend. She noticed some blood on Friday nigh after using the restroom when wiping. Nothing after that. Saturday she did have some cramping, and that resolved as well. She is currently 14w 4d, blood type is A positive. Informed patient to monitor for now. If she starts bleeding again bright red blood and saturating a pad per hour, or pain at 10/10 to report to the ED. She is on pelvic rest till she is cleared by Dr. Dea Alva at her next visit on 6/22/23. Routing to on call provider Dr. Asif Pruitt, Dr. Dea Alva is out of office.

## 2023-06-22 ENCOUNTER — ROUTINE PRENATAL (OUTPATIENT)
Dept: OBGYN CLINIC | Age: 28
End: 2023-06-22

## 2023-06-22 VITALS
SYSTOLIC BLOOD PRESSURE: 110 MMHG | WEIGHT: 223.2 LBS | DIASTOLIC BLOOD PRESSURE: 68 MMHG | BODY MASS INDEX: 41.49 KG/M2 | HEART RATE: 109 BPM

## 2023-06-22 DIAGNOSIS — Z34.01 ENCOUNTER FOR PRENATAL CARE IN FIRST TRIMESTER OF FIRST PREGNANCY: Primary | ICD-10-CM

## 2023-06-22 DIAGNOSIS — E66.01 CLASS 3 SEVERE OBESITY DUE TO EXCESS CALORIES WITHOUT SERIOUS COMORBIDITY WITH BODY MASS INDEX (BMI) OF 40.0 TO 44.9 IN ADULT (HCC): ICD-10-CM

## 2023-06-22 DIAGNOSIS — N85.8 UTERINE MASS: ICD-10-CM

## 2023-06-22 DIAGNOSIS — J45.990 EXERCISE-INDUCED ASTHMA: ICD-10-CM

## 2023-06-26 ENCOUNTER — TELEPHONE (OUTPATIENT)
Dept: OBGYN CLINIC | Age: 28
End: 2023-06-26

## 2023-06-26 RX ORDER — LANSOPRAZOLE 30 MG/1
30 CAPSULE, DELAYED RELEASE ORAL DAILY
Qty: 90 CAPSULE | Refills: 1 | Status: SHIPPED | OUTPATIENT
Start: 2023-06-26

## 2023-06-26 RX ORDER — PANTOPRAZOLE SODIUM 20 MG/1
20 TABLET, DELAYED RELEASE ORAL DAILY
Qty: 30 TABLET | Refills: 3 | OUTPATIENT
Start: 2023-06-26

## 2023-07-19 ENCOUNTER — ROUTINE PRENATAL (OUTPATIENT)
Dept: OBGYN CLINIC | Age: 28
End: 2023-07-19

## 2023-07-19 VITALS
BODY MASS INDEX: 42.01 KG/M2 | HEART RATE: 97 BPM | WEIGHT: 226 LBS | SYSTOLIC BLOOD PRESSURE: 108 MMHG | DIASTOLIC BLOOD PRESSURE: 70 MMHG

## 2023-07-19 DIAGNOSIS — J45.990 EXERCISE-INDUCED ASTHMA: ICD-10-CM

## 2023-07-19 DIAGNOSIS — E66.01 CLASS 3 SEVERE OBESITY DUE TO EXCESS CALORIES WITHOUT SERIOUS COMORBIDITY WITH BODY MASS INDEX (BMI) OF 40.0 TO 44.9 IN ADULT (HCC): ICD-10-CM

## 2023-07-19 DIAGNOSIS — O21.9 NAUSEA AND VOMITING IN PREGNANCY: ICD-10-CM

## 2023-07-19 DIAGNOSIS — Z34.02 ENCOUNTER FOR PRENATAL CARE IN SECOND TRIMESTER OF FIRST PREGNANCY: Primary | ICD-10-CM

## 2023-07-19 DIAGNOSIS — N85.8 UTERINE MASS: ICD-10-CM

## 2023-07-19 PROCEDURE — 0501F PRENATAL FLOW SHEET: CPT | Performed by: OBSTETRICS & GYNECOLOGY

## 2023-07-19 NOTE — PROGRESS NOTES
Temp-98. 1f infrared  Maternal emotional well being screening form completed and reviewed with patient. Current score is 2. Patient given referral to [address] [place] [address] ([phone]):  No

## 2023-07-27 PROBLEM — Z34.02 ENCOUNTER FOR PRENATAL CARE IN SECOND TRIMESTER OF FIRST PREGNANCY: Status: ACTIVE | Noted: 2023-05-11

## 2023-07-27 PROBLEM — O21.9 NAUSEA AND VOMITING IN PREGNANCY: Status: ACTIVE | Noted: 2023-07-27

## 2023-07-27 NOTE — PROGRESS NOTES
25 y/o  female at 20 weeks 6 days gestation with [facility] 23 presents for prenatal visit  Pregnancy is complicated by echogenic intrauterine mass adjacent to amnion (not seen today), maternal weight, exercise induced asthma, and anxiety. FDLMP: 23. Seen by MFM on 7/10/23 for level 2 ultrasound (BMI) and to evaluate intrauterine mass (echogenic intrauterine mass adjacent to amnion) seen on previous imaging. No mass appreciated--see MFM consult. Anatomy survey limited:  Limited views of cavum, lips, palate, orbits/eyes, right outflow, left outflow, aortic arch, ductal arch, 3 vessel view, fingers. Medications:  generic celexa, Pantoprazol, prenatal vitamin, and Unisom. Denies leakage of fluid, vaginal discharge and pelvic pain. Admits to some tightening in upper abdomen. Denies headaches, vision changes and RUQ pain. Nausea persists, no vomiting. Still taking Unisom prn. Denies fever, chills, chest pain, shortness of breath, vomiting, diarrhea, constipation, dysuria and hematuria. Maternal Wellness Questionnaire reviewed--no concerns. Diagnosis Orders   1. Encounter for prenatal care in second trimester of first pregnancy        2. Uterine mass        3. Class 3 severe obesity due to excess calories without serious comorbidity with body mass index (BMI) of 40.0 to 44.9 in adult ([address])        4. Exercise-induced asthma        5. Nausea and vomiting in pregnancy          MFM recommendations and findings reviewed. Growth scan every 4 weeks--follow up anatomy survey: cavum, lips, palate, orbits/eyes, right outflow, left outflow, aortic arch, ductal arch, 3 vessel view, fingers. Weekly BPP at 32-34 weeks  Follow up prn and 3-4 weeks for prenatal visit and ultrasound.

## 2023-08-02 ENCOUNTER — TELEPHONE (OUTPATIENT)
Dept: OBGYN CLINIC | Age: 28
End: 2023-08-02

## 2023-08-02 NOTE — TELEPHONE ENCOUNTER
Pt states the pharmacy has been trying to reach our office,. She says a PA is needed for the Prevacid or an alternative prescription to be sent. Please advise.

## 2023-08-03 NOTE — TELEPHONE ENCOUNTER
Pharmacy called back needing a PA for the protonix. The insurance is requiring a 90 day supply, however it is only covered one time as a 90 day supply in a 365 day period. If patient is going to be on it longer, she will need  PA. Please call [phone] with Matt Zavaleta ID: [id number] if provider wants to pursue PA. Routing to Dr. Tsosie Rubinstein and Martnia Mohan.

## 2023-08-03 NOTE — TELEPHONE ENCOUNTER
Spoke with Pharmacy they stated Protonix had been covered in the past 20 mg po daily 90 day supply with no refills. Spoke with pt and she stated this had worked for her in the past. Pharmacy changing prescription to [facility] as previously written.  Please advise

## 2023-08-04 NOTE — TELEPHONE ENCOUNTER
Please make sure patient is aware of situation and check on symptoms and need for medication.   Thanks

## 2023-08-07 NOTE — TELEPHONE ENCOUNTER
Pt is aware and would like PA for continuing use of this medication. Called and got PA started, they will fax a form relating to PA needs.

## 2023-08-24 ENCOUNTER — ROUTINE PRENATAL (OUTPATIENT)
Dept: OBGYN CLINIC | Age: 28
End: 2023-08-24

## 2023-08-24 VITALS
DIASTOLIC BLOOD PRESSURE: 60 MMHG | HEART RATE: 90 BPM | WEIGHT: 230 LBS | SYSTOLIC BLOOD PRESSURE: 110 MMHG | BODY MASS INDEX: 42.75 KG/M2

## 2023-08-24 DIAGNOSIS — F41.1 GAD (GENERALIZED ANXIETY DISORDER): ICD-10-CM

## 2023-08-24 DIAGNOSIS — E66.01 CLASS 3 SEVERE OBESITY DUE TO EXCESS CALORIES WITHOUT SERIOUS COMORBIDITY WITH BODY MASS INDEX (BMI) OF 40.0 TO 44.9 IN ADULT (HCC): ICD-10-CM

## 2023-08-24 DIAGNOSIS — J45.990 EXERCISE-INDUCED ASTHMA: ICD-10-CM

## 2023-08-24 DIAGNOSIS — N85.8 UTERINE MASS: ICD-10-CM

## 2023-08-24 DIAGNOSIS — Z34.02 ENCOUNTER FOR PRENATAL CARE IN SECOND TRIMESTER OF FIRST PREGNANCY: Primary | ICD-10-CM

## 2023-08-24 DIAGNOSIS — O21.9 NAUSEA AND VOMITING IN PREGNANCY: ICD-10-CM

## 2023-08-24 PROCEDURE — 0502F SUBSEQUENT PRENATAL CARE: CPT | Performed by: OBSTETRICS & GYNECOLOGY

## 2023-08-24 RX ORDER — NITROFURANTOIN 25; 75 MG/1; MG/1
CAPSULE ORAL
COMMUNITY
Start: 2023-08-20

## 2023-08-24 SDOH — ECONOMIC STABILITY: FOOD INSECURITY: WITHIN THE PAST 12 MONTHS, YOU WORRIED THAT YOUR FOOD WOULD RUN OUT BEFORE YOU GOT MONEY TO BUY MORE.: NEVER TRUE

## 2023-08-24 SDOH — ECONOMIC STABILITY: FOOD INSECURITY: WITHIN THE PAST 12 MONTHS, THE FOOD YOU BOUGHT JUST DIDN'T LAST AND YOU DIDN'T HAVE MONEY TO GET MORE.: NEVER TRUE

## 2023-08-24 SDOH — ECONOMIC STABILITY: INCOME INSECURITY: HOW HARD IS IT FOR YOU TO PAY FOR THE VERY BASICS LIKE FOOD, HOUSING, MEDICAL CARE, AND HEATING?: NOT HARD AT ALL

## 2023-08-30 NOTE — PROGRESS NOTES
Maternal emotional well being screening form completed and reviewed with patient. Current score is 8. Patient given referral to [address] [place] [address] ([phone]):  No
141bpm                BPD                                                                 6.88cm                        89.3 %              Head Circumference                                       24.91cm                      62.1 %               Abdominal Circumference                              21.78cm                      49.0 %              Femur Length                                                  5.36cm                        94.0 %                Amniotic fluid index                                         3.77cm                EFW                                                                1037g              84.7 percentile     Amniotic fluid volume is normal. Based on sonographic criteria the estimated fetal age is 27weeks and 2days with EDC of . There is a 9 day discordance with the established EDC of 2023. The patient has a posterior placenta that is adequate distance in relation to the internal cervical os. The evaluation of the lower uterine segment and cervix reveals normal appearing anatomy. The uterus is unremarkable/gravid. Maternal ovaries and adnexae are not well visualized due to the size of the uterus and patient's gravid state. Anatomy seen includes: 4 chamber heart, stomach, kidneys, bladder, RVOT, CSP,palate orbits. IMPRESSION:  Single live IUP in the second trimester. Adequate interval fetal growth. Sub-optimal Nose/lips, LVOT, Aortic arch, ductal arch, 3VV. Imaging is limited secondary to maternal body habitus. The patient is well aware of the limitations of ultrasound in the detection of anomalies. Assessment/Plan: Sangeetha Hu is a 34 y.o.  at 26w0d who presents for routine OB visit    1.  Encounter for prenatal care in second trimester of first pregnancy     - Doing well today without complaints     - Fetal wellbeing reassuring by US today     - Anatomy images remain limited today - Sub-optimal Nose/lips, LVOT, Aortic arch,

## 2023-09-01 ENCOUNTER — ROUTINE PRENATAL (OUTPATIENT)
Dept: OBGYN CLINIC | Age: 28
End: 2023-09-01

## 2023-09-01 VITALS
SYSTOLIC BLOOD PRESSURE: 126 MMHG | OXYGEN SATURATION: 99 % | HEIGHT: 62 IN | BODY MASS INDEX: 42.69 KG/M2 | HEART RATE: 98 BPM | DIASTOLIC BLOOD PRESSURE: 70 MMHG | WEIGHT: 232 LBS

## 2023-09-01 DIAGNOSIS — Z34.02 ENCOUNTER FOR PRENATAL CARE IN SECOND TRIMESTER OF FIRST PREGNANCY: Primary | ICD-10-CM

## 2023-09-01 DIAGNOSIS — F41.1 GAD (GENERALIZED ANXIETY DISORDER): ICD-10-CM

## 2023-09-01 DIAGNOSIS — E66.01 CLASS 3 SEVERE OBESITY DUE TO EXCESS CALORIES WITHOUT SERIOUS COMORBIDITY WITH BODY MASS INDEX (BMI) OF 40.0 TO 44.9 IN ADULT (HCC): ICD-10-CM

## 2023-09-01 DIAGNOSIS — N85.8 UTERINE MASS: ICD-10-CM

## 2023-09-02 LAB
BASOPHILS # BLD: 0.1 K/UL (ref 0–0.2)
BASOPHILS NFR BLD: 0.5 %
DEPRECATED RDW RBC AUTO: 13 % (ref 12.4–15.4)
EOSINOPHIL # BLD: 0.2 K/UL (ref 0–0.6)
EOSINOPHIL NFR BLD: 1.6 %
GLUCOSE 1H P 50 G GLC PO SERPL-MCNC: 115 MG/DL
HCT VFR BLD AUTO: 32.5 % (ref 36–48)
HGB BLD-MCNC: 11.4 G/DL (ref 12–16)
LYMPHOCYTES # BLD: 2.1 K/UL (ref 1–5.1)
LYMPHOCYTES NFR BLD: 20.7 %
MCH RBC QN AUTO: 31.3 PG (ref 26–34)
MCHC RBC AUTO-ENTMCNC: 35 G/DL (ref 31–36)
MCV RBC AUTO: 89.4 FL (ref 80–100)
MONOCYTES # BLD: 0.5 K/UL (ref 0–1.3)
MONOCYTES NFR BLD: 5.3 %
NEUTROPHILS # BLD: 7.1 K/UL (ref 1.7–7.7)
NEUTROPHILS NFR BLD: 71.9 %
PLATELET # BLD AUTO: 199 K/UL (ref 135–450)
PMV BLD AUTO: 9 FL (ref 5–10.5)
RBC # BLD AUTO: 3.63 M/UL (ref 4–5.2)
WBC # BLD AUTO: 9.9 K/UL (ref 4–11)

## 2023-09-06 ENCOUNTER — TELEPHONE (OUTPATIENT)
Dept: OBGYN CLINIC | Age: 28
End: 2023-09-06

## 2023-09-06 NOTE — TELEPHONE ENCOUNTER
Patient calling to report some cramping starting yesterday, and today. She did not hydrate very well yesterday, and has been pushing fluids last night and today. She is having positive fetal movement, baby was not moving very well much in the morning, did increase movements in the evening. Today is much better and baby is active. She will continue to push fluids and monitor symptoms. Labor and pain precautions reviewed, patient voiced understanding. She will report to the L&D if needed. Aware this is a normal body response if we get dehydrated in pregnancy. Routing to Dr. Sd Hickey and on call Dr. Cher Peres.

## 2023-09-11 ENCOUNTER — ROUTINE PRENATAL (OUTPATIENT)
Dept: OBGYN CLINIC | Age: 28
End: 2023-09-11

## 2023-09-11 VITALS
TEMPERATURE: 97.9 F | WEIGHT: 232.2 LBS | DIASTOLIC BLOOD PRESSURE: 78 MMHG | BODY MASS INDEX: 43.16 KG/M2 | HEART RATE: 101 BPM | SYSTOLIC BLOOD PRESSURE: 124 MMHG

## 2023-09-11 DIAGNOSIS — Z36.2 ENCOUNTER FOR FOLLOW-UP ULTRASOUND OF FETAL ANATOMY: ICD-10-CM

## 2023-09-11 DIAGNOSIS — E66.01 CLASS 3 SEVERE OBESITY DUE TO EXCESS CALORIES WITHOUT SERIOUS COMORBIDITY WITH BODY MASS INDEX (BMI) OF 40.0 TO 44.9 IN ADULT (HCC): ICD-10-CM

## 2023-09-11 DIAGNOSIS — N85.8 UTERINE MASS: ICD-10-CM

## 2023-09-11 DIAGNOSIS — F41.1 GAD (GENERALIZED ANXIETY DISORDER): ICD-10-CM

## 2023-09-11 DIAGNOSIS — Z34.03 ENCOUNTER FOR PRENATAL CARE IN THIRD TRIMESTER OF FIRST PREGNANCY: Primary | ICD-10-CM

## 2023-09-11 DIAGNOSIS — J45.990 EXERCISE-INDUCED ASTHMA: ICD-10-CM

## 2023-09-11 DIAGNOSIS — N94.9 ROUND LIGAMENT PAIN: ICD-10-CM

## 2023-09-11 PROCEDURE — 0502F SUBSEQUENT PRENATAL CARE: CPT | Performed by: OBSTETRICS & GYNECOLOGY

## 2023-09-11 NOTE — PROGRESS NOTES
Maternal emotional well being screening form completed and reviewed with patient.  Current score is 7
disorder)        6. Exercise-induced asthma        7. Encounter for follow-up ultrasound of fetal anatomy             1. Encounter for prenatal care in second trimester of first pregnancy     - Doing well today without complaints     - Fetal wellbeing reassuring by US today     - Anatomy images remain limited today - Sub-optimal Nose/lips, LVOT, Aortic arch, ductal arch, 3VV. Otherwise normal appearing     - 28 wk labs normal     - TDAP 23     - GBS 35-37 wks     2. Class 3 severe obesity due to excess calories without serious comorbidity with body mass index (BMI) of 40.0 to 44.9 in adult (HCC)     - Pre-pregnancy BMI 39.9     - TWG 17 lb 3.2 oz (7.802 kg)      - EFW 84.7 %ile     - Will continue serial growth scans and ANFS starting at 32-34 weeks   - Growth US in 2 weeks with  testing      - Glucose screen nml     3. Exercise-induced asthma    - No complaints today     4. Nausea and vomiting in pregnancy   - Doing well          5. Uterine mass     - MFM 7/10/2023 for level 2 us and to evaluate echogenic intrauterine mass adjacent to the amnion     - Resolved with MFM scan    6. SILVINA    - Doing well with Celexa 20mg -- no concerns    Follow Up  Return OB precautions reviewed   Return in about 2 weeks (around 2023) for Return OB visit.     Lillie Walter DO

## 2023-09-13 DIAGNOSIS — F41.1 GAD (GENERALIZED ANXIETY DISORDER): ICD-10-CM

## 2023-09-13 NOTE — TELEPHONE ENCOUNTER
.Refill Request     CONFIRM preferred pharmacy with the patient. If Mail Order Rx - Pend for 90 day refill. Last Seen: Last Seen Department: 4/12/2023  Last Seen by PCP: 4/12/2023    Last Written: 3-10-23 90 with 1     If no future appointment scheduled:  Review the last OV with PCP and review information for follow-up visit,  Route STAFF MESSAGE with patient name to the [facility] for scheduling with the following information:            -  Timing of next visit           -  Visit type ie Physical, OV, etc           -  Diagnoses/Reason ie. COPD, HTN - Do not use MEDICATION, Follow-up or CHECK UP - Give reason for visit      Next Appointment:   Future Appointments   Date Time Provider [address]   9/27/2023  2:20 PM Ollen Opitz, DO [facility]   10/11/2023  3:20 PM Ollen Opitz, DO [facility]   10/11/2023  3:30 PM [address] [facility] OB[facility] US RM 2 [facility]   10/17/2023  3:15 PM [facility] US RM 1 [facility]   10/17/2023  3:20 PM Ollen Opitz, DO [facility]   10/23/2023  2:30 PM [facility] US RM 1 [facility]   10/23/2023  3:10 PM Ollen Opitz, DO [facility]   11/1/2023  1:00 PM [facility] US RM 1 [facility]   11/1/2023  1:30 PM Ollen Opitz, DO [facility]   11/6/2023  3:15 PM [facility] US RM 1 [facility]   11/6/2023  3:20 PM Ollen Opitz, DO [facility]       Message sent to [address] [place] [address] to schedule appt with patient?   YES      Requested Prescriptions     Pending Prescriptions Disp Refills    citalopram (CELEXA) 20 MG tablet [Pharmacy Med Name: CITALOPRAM HBR 20 MG TABLET] 90 tablet 1     Sig: TAKE 1 TABLET BY MOUTH EVERY DAY

## 2023-09-15 RX ORDER — CITALOPRAM 20 MG/1
TABLET ORAL
Qty: 90 TABLET | Refills: 1 | Status: SHIPPED | OUTPATIENT
Start: 2023-09-15

## 2023-09-15 NOTE — TELEPHONE ENCOUNTER
Spoke with pt she stated that she decided she was not going to stop this medication and that she would like to stay at the 20 MG 1x daily dose please advise on refill

## 2023-09-27 ENCOUNTER — ROUTINE PRENATAL (OUTPATIENT)
Dept: OBGYN CLINIC | Age: 28
End: 2023-09-27

## 2023-09-27 VITALS
SYSTOLIC BLOOD PRESSURE: 102 MMHG | WEIGHT: 233 LBS | BODY MASS INDEX: 43.31 KG/M2 | DIASTOLIC BLOOD PRESSURE: 60 MMHG | HEART RATE: 112 BPM

## 2023-09-27 DIAGNOSIS — O26.899 PELVIC PRESSURE IN PREGNANCY: Primary | ICD-10-CM

## 2023-09-27 DIAGNOSIS — O21.9 NAUSEA AND VOMITING IN PREGNANCY: ICD-10-CM

## 2023-09-27 DIAGNOSIS — E66.01 CLASS 3 SEVERE OBESITY DUE TO EXCESS CALORIES WITHOUT SERIOUS COMORBIDITY WITH BODY MASS INDEX (BMI) OF 40.0 TO 44.9 IN ADULT (HCC): ICD-10-CM

## 2023-09-27 DIAGNOSIS — R10.2 PELVIC PRESSURE IN PREGNANCY: Primary | ICD-10-CM

## 2023-09-27 DIAGNOSIS — Z34.02 ENCOUNTER FOR PRENATAL CARE IN SECOND TRIMESTER OF FIRST PREGNANCY: ICD-10-CM

## 2023-09-27 PROCEDURE — 0502F SUBSEQUENT PRENATAL CARE: CPT | Performed by: OBSTETRICS & GYNECOLOGY

## 2023-09-28 LAB
BACTERIA URNS QL MICRO: ABNORMAL /HPF
BILIRUB UR QL STRIP.AUTO: NEGATIVE
CLARITY UR: ABNORMAL
COLOR UR: YELLOW
EPI CELLS #/AREA URNS AUTO: 10 /HPF (ref 0–5)
GLUCOSE UR STRIP.AUTO-MCNC: NEGATIVE MG/DL
HGB UR QL STRIP.AUTO: NEGATIVE
HYALINE CASTS #/AREA URNS AUTO: 4 /LPF (ref 0–8)
KETONES UR STRIP.AUTO-MCNC: NEGATIVE MG/DL
LEUKOCYTE ESTERASE UR QL STRIP.AUTO: ABNORMAL
NITRITE UR QL STRIP.AUTO: NEGATIVE
PH UR STRIP.AUTO: 6 [PH] (ref 5–8)
PROT UR STRIP.AUTO-MCNC: ABNORMAL MG/DL
RBC CLUMPS #/AREA URNS AUTO: 3 /HPF (ref 0–4)
SP GR UR STRIP.AUTO: 1.03 (ref 1–1.03)
UA DIPSTICK W REFLEX MICRO PNL UR: YES
URN SPEC COLLECT METH UR: ABNORMAL
UROBILINOGEN UR STRIP-ACNC: 0.2 E.U./DL
WBC #/AREA URNS AUTO: 9 /HPF (ref 0–5)

## 2023-09-29 LAB — BACTERIA UR CULT: NORMAL

## 2023-10-02 NOTE — PROGRESS NOTES
23 y/o  female at 30 weeks 6 days gestation with [facility] 23 presents for prenatal visit  Pregnancy is complicated by echogenic intrauterine mass adjacent to amnion (not seen in subsequent imaging), maternal weight, exercise induced asthma, and anxiety. FDLMP: 23. Seen by MFM on 7/10/23 for level 2 ultrasound (BMI) and to evaluate intrauterine mass (echogenic intrauterine mass adjacent to amnion) seen on previous imaging. No mass appreciated--see MFM consult. Anatomy survey limited:  Limited views of cavum, lips, palate, orbits/eyes, right outflow, left outflow, aortic arch, ductal arch, 3 vessel view, fingers. Medications:  generic celexa, Pantoprazol, prenatal vitamin, and Unisom. Denies leakage of fluid, vaginal discharge and pelvic pain. Admits to some cramping--not every day   Denies headaches, vision changes and RUQ pain. Nausea persists, occasional vomiting. Still taking Unisom prn. Denies fever, chills, chest pain, shortness of breath, vomiting, diarrhea, constipation, dysuria and hematuria. Maternal Wellness Questionnaire reviewed--no concerns. EFW: 23: 1037 g, 84.7%  EFW: 23: 213 g, 90.7%     Diagnosis Orders   1. Pelvic pressure in pregnancy  Culture, Urine    Urinalysis with Microscopic      2. Encounter for prenatal care in second trimester of first pregnancy        3. Nausea and vomiting in pregnancy        4.  Class 3 severe obesity due to excess calories without serious comorbidity with body mass index (BMI) of 40.0 to 44.9 in adult [facility])          Weekly BPPs at 32-34 weeks  Serial growth scans  Labor precautions, kick counts  Follow up prn and 2 weeks for prenatal visit and ultrasound

## 2023-10-11 ENCOUNTER — ROUTINE PRENATAL (OUTPATIENT)
Dept: OBGYN CLINIC | Age: 28
End: 2023-10-11

## 2023-10-11 VITALS
BODY MASS INDEX: 43.31 KG/M2 | WEIGHT: 233 LBS | SYSTOLIC BLOOD PRESSURE: 100 MMHG | DIASTOLIC BLOOD PRESSURE: 68 MMHG | HEART RATE: 108 BPM

## 2023-10-11 DIAGNOSIS — Z34.03 ENCOUNTER FOR PRENATAL CARE IN THIRD TRIMESTER OF FIRST PREGNANCY: Primary | ICD-10-CM

## 2023-10-11 DIAGNOSIS — E66.01 CLASS 3 SEVERE OBESITY DUE TO EXCESS CALORIES WITHOUT SERIOUS COMORBIDITY WITH BODY MASS INDEX (BMI) OF 40.0 TO 44.9 IN ADULT (HCC): ICD-10-CM

## 2023-10-11 DIAGNOSIS — J45.990 EXERCISE-INDUCED ASTHMA: ICD-10-CM

## 2023-10-11 DIAGNOSIS — O21.9 NAUSEA AND VOMITING IN PREGNANCY: ICD-10-CM

## 2023-10-11 NOTE — PROGRESS NOTES
Maternal emotional well being screening form completed and reviewed with patient. Current score is 8. Patient given referral to [address] [place] [address] ([phone]):  No

## 2023-10-17 ENCOUNTER — ROUTINE PRENATAL (OUTPATIENT)
Dept: OBGYN CLINIC | Age: 28
End: 2023-10-17

## 2023-10-17 DIAGNOSIS — O21.9 NAUSEA AND VOMITING IN PREGNANCY: ICD-10-CM

## 2023-10-17 DIAGNOSIS — J45.990 EXERCISE-INDUCED ASTHMA: ICD-10-CM

## 2023-10-17 DIAGNOSIS — E66.01 CLASS 3 SEVERE OBESITY DUE TO EXCESS CALORIES WITHOUT SERIOUS COMORBIDITY WITH BODY MASS INDEX (BMI) OF 40.0 TO 44.9 IN ADULT (HCC): Primary | ICD-10-CM

## 2023-10-17 DIAGNOSIS — Z34.03 ENCOUNTER FOR PRENATAL CARE IN THIRD TRIMESTER OF FIRST PREGNANCY: ICD-10-CM

## 2023-10-17 NOTE — PROGRESS NOTES
35 y/o  female at 32 weeks 6 days gestation with [facility] 23 presents for prenatal visit and fetal monitoring  Pregnancy is complicated by echogenic intrauterine mass adjacent to amnion (not seen in subsequent imaging--resolved), maternal weight, exercise induced asthma, and anxiety. FDLMP: 23. Seen by MFM on 7/10/23 for level 2 ultrasound (BMI) and to evaluate intrauterine mass (echogenic intrauterine mass adjacent to amnion) seen on previous imaging. No mass appreciated--see MFM consult. Anatomy survey limited:  Limited views of cavum, lips, palate, orbits/eyes, right outflow, left outflow, aortic arch, ductal arch, 3 vessel view, fingers. Medications:  generic celexa, Pantoprazol, prenatal vitamin, and Unisom. Denies leakage of fluid, vaginal discharge and pelvic pain. Admits to some cramping--not every day--similar to menstrual cramps. Pelvic pressure noted at past visits has been stable  Denies headaches, vision changes and RUQ pain. Admits to numbness in right thigh when standing for prolonged periods. Nausea persists--sometimes, occasional vomiting. Taking Unisom prn. Denies fever, chills, chest pain, shortness of breath, vomiting, diarrhea, constipation, dysuria and hematuria. Maternal Wellness Questionnaire reviewed--no concerns. EFW: 23: 213 g, 90.7%  EFW: 23: 1037 g, 84.7%    OB ULTRASOUND:  BIOPHYSICAL PROFILE     DATE: 10/11/2023     PHYSICIAN: MATILDE Urbina D.O.     SONOGRAPHER: Loretta Allen RDMS     INDICATION: Fetal well being     TYPE OF SCAN: abdominal     BPP:   Tone: 2, Gross fetal movement: 2, Breathin, Fluid: 2     FINDINGS:  A single viable intrauterine pregnancy is noted in Breech presentation. Cardiac and somatic activity are noted.  Nose, lips, LVOT, and 3 VV visualized and normal.      The following values were obtained:              Fetal heart rate 135bpm              Amniotic fluid index 12.59cm     IMPRESSION:   Single live IUP in the third

## 2023-10-18 ENCOUNTER — TELEPHONE (OUTPATIENT)
Dept: FAMILY MEDICINE CLINIC | Age: 28
End: 2023-10-18

## 2023-10-19 ENCOUNTER — OFFICE VISIT (OUTPATIENT)
Dept: FAMILY MEDICINE CLINIC | Age: 28
End: 2023-10-19
Payer: COMMERCIAL

## 2023-10-19 VITALS
BODY MASS INDEX: 42.94 KG/M2 | SYSTOLIC BLOOD PRESSURE: 122 MMHG | OXYGEN SATURATION: 98 % | HEART RATE: 100 BPM | DIASTOLIC BLOOD PRESSURE: 74 MMHG | WEIGHT: 231 LBS

## 2023-10-19 DIAGNOSIS — J40 BRONCHITIS: Primary | ICD-10-CM

## 2023-10-19 PROCEDURE — 99213 OFFICE O/P EST LOW 20 MIN: CPT | Performed by: PHYSICIAN ASSISTANT

## 2023-10-19 RX ORDER — METHYLPREDNISOLONE 4 MG/1
TABLET ORAL
Qty: 1 KIT | Refills: 0 | Status: SHIPPED | OUTPATIENT
Start: 2023-10-19

## 2023-10-19 RX ORDER — AMOXICILLIN 875 MG/1
TABLET, COATED ORAL
COMMUNITY
Start: 2023-10-15

## 2023-10-19 ASSESSMENT — ENCOUNTER SYMPTOMS
SORE THROAT: 0
SHORTNESS OF BREATH: 0
COUGH: 1
WHEEZING: 1

## 2023-10-19 NOTE — PROGRESS NOTES
Duran Weber (:  1995) is a 34 y.o. female,Established patient, here for evaluation of the following chief complaint(s):  Cough (Diagnosed with bronchitis 4 days ago at urgent care, was given abx, states she has just been having trouble breathing ever since, asthma is getting worse )         ASSESSMENT/PLAN:  1. Bronchitis  -     methylPREDNISolone (MEDROL DOSEPACK) 4 MG tablet; Take by mouth., Disp-1 kit, R-0Normal  -    uncontrolled, albuterol is ineffective, will start her on a medrol dose pack. Continue with amoxicillin, vitals healthy today. Follow up as needed      Return if symptoms worsen or fail to improve. Subjective   SUBJECTIVE/OBJECTIVE:  HPI    URI  Timin days  Pt was seen at an urgent care and diagnosed with bronchitis  Covid: neg  Please see ROS  Complicating factors: pregnant  Tx: amoxicillin, has half a prescription left  Albuterol- every 4-6 hours, was not helpful last night    Review of Systems   Constitutional:  Negative for chills, diaphoresis and fever. HENT:  Negative for sore throat. Respiratory:  Positive for cough and wheezing. Negative for shortness of breath. Cardiovascular:  Negative for palpitations and leg swelling. Musculoskeletal:  Negative for myalgias. Neurological:  Negative for dizziness, light-headedness and headaches. Objective   Physical Exam  Vitals reviewed. Constitutional:       Appearance: Normal appearance. HENT:      Head: Normocephalic and atraumatic. Right Ear: Hearing and ear canal normal. A middle ear effusion is present. Tympanic membrane is bulging. Left Ear: Hearing, tympanic membrane and ear canal normal.      Nose: Nose normal.      Mouth/Throat:      Lips: Pink. Mouth: Mucous membranes are moist.      Pharynx: No oropharyngeal exudate, posterior oropharyngeal erythema or uvula swelling.    Eyes:      Conjunctiva/sclera: Conjunctivae normal.   Cardiovascular:      Rate and Rhythm: Normal rate and

## 2023-10-26 ENCOUNTER — ROUTINE PRENATAL (OUTPATIENT)
Dept: OBGYN CLINIC | Age: 28
End: 2023-10-26

## 2023-10-26 VITALS
DIASTOLIC BLOOD PRESSURE: 68 MMHG | TEMPERATURE: 97.8 F | SYSTOLIC BLOOD PRESSURE: 110 MMHG | WEIGHT: 232.8 LBS | HEART RATE: 109 BPM | BODY MASS INDEX: 43.27 KG/M2

## 2023-10-26 DIAGNOSIS — Z34.03 ENCOUNTER FOR PRENATAL CARE IN THIRD TRIMESTER OF FIRST PREGNANCY: Primary | ICD-10-CM

## 2023-10-26 DIAGNOSIS — O32.0XX0 UNSTABLE FETAL LIE, SINGLE OR UNSPECIFIED FETUS: ICD-10-CM

## 2023-10-26 DIAGNOSIS — J45.990 EXERCISE-INDUCED ASTHMA: ICD-10-CM

## 2023-10-26 DIAGNOSIS — E66.01 CLASS 3 SEVERE OBESITY DUE TO EXCESS CALORIES WITHOUT SERIOUS COMORBIDITY WITH BODY MASS INDEX (BMI) OF 40.0 TO 44.9 IN ADULT (HCC): ICD-10-CM

## 2023-10-26 DIAGNOSIS — O21.9 NAUSEA AND VOMITING IN PREGNANCY: ICD-10-CM

## 2023-10-26 PROCEDURE — 0502F SUBSEQUENT PRENATAL CARE: CPT | Performed by: OBSTETRICS & GYNECOLOGY

## 2023-10-30 NOTE — PROGRESS NOTES
35 y/o  female at 35 weeks 5 days gestation with [address] 23 presents for prenatal visit and ultrasound  Pregnancy is complicated by echogenic intrauterine mass adjacent to amnion (not seen in subsequent imaging--resolved), maternal weight, exercise induced asthma, and anxiety. FDLMP: 23. Seen by MFM on 7/10/23 for level 2 ultrasound (BMI) and to evaluate intrauterine mass (echogenic intrauterine mass adjacent to amnion) seen on previous imaging. No mass appreciated--see MFM consult. Anatomy survey limited:  Limited views of cavum, lips, palate, orbits/eyes, right outflow, left outflow, aortic arch, ductal arch, 3 vessel view, fingers. Medications:  generic celexa, Pantoprazol, prenatal vitamin, and Unisom. Denies vaginal bleeding, leakage of fluid, vaginal discharge and pelvic pain. Admits to fetal movement. Denies headaches, vision changes and RUQ pain. Nausea persists--sometimes, occasional vomiting. Taking Unisom prn. Denies fever, chills, chest pain, shortness of breath, vomiting, diarrhea, constipation, dysuria and hematuria. Maternal Wellness Questionnaire reviewed--no concerns. EFW: 23: 213 g, 90.7%  EFW: 23: 1037 g, 84.7%  EFW: 10/17/23: 2648 g, 86%      OBSTETRICAL ULTRASOUND GROWTH     DATE: 10/17/23     PHYSICIAN: MATILDE Urbina D.O.      SONOGRAPHER: CORNELIO Lindsey RDMS     INDICATION: Growth, BPP     TYPE OF SCAN: abdominal     FINDINGS:  A single viable intrauterine pregnancy is noted in Breech presentation. Cardiac and somatic activity are noted.      The following values were obtained:              Fetal heart rate                                               137bpm              BPD                                                                 8.58cm                        72.0 %              Head Circumference                                       32.97cm                      94.7 %               Abdominal Circumference                              31.58cm

## 2023-11-01 ENCOUNTER — ROUTINE PRENATAL (OUTPATIENT)
Dept: OBGYN CLINIC | Age: 28
End: 2023-11-01

## 2023-11-01 VITALS
WEIGHT: 232.2 LBS | DIASTOLIC BLOOD PRESSURE: 80 MMHG | HEART RATE: 113 BPM | BODY MASS INDEX: 43.16 KG/M2 | OXYGEN SATURATION: 100 % | TEMPERATURE: 97.8 F | SYSTOLIC BLOOD PRESSURE: 115 MMHG

## 2023-11-01 DIAGNOSIS — F41.1 GAD (GENERALIZED ANXIETY DISORDER): ICD-10-CM

## 2023-11-01 DIAGNOSIS — Z34.03 ENCOUNTER FOR PRENATAL CARE IN THIRD TRIMESTER OF FIRST PREGNANCY: Primary | ICD-10-CM

## 2023-11-01 DIAGNOSIS — E66.01 CLASS 3 SEVERE OBESITY DUE TO EXCESS CALORIES WITHOUT SERIOUS COMORBIDITY WITH BODY MASS INDEX (BMI) OF 40.0 TO 44.9 IN ADULT (HCC): ICD-10-CM

## 2023-11-01 PROCEDURE — 0502F SUBSEQUENT PRENATAL CARE: CPT | Performed by: OBSTETRICS & GYNECOLOGY

## 2023-11-01 NOTE — PROGRESS NOTES
Maternal emotional well being screening form completed and reviewed with patient.  Current score is 2

## 2023-11-03 NOTE — PROGRESS NOTES
33 y/o  female at 35 weeks 6 days gestation with [facility] 23 presents for prenatal visit and ultrasound  Pregnancy is complicated by echogenic intrauterine mass adjacent to amnion (not seen in subsequent imaging--resolved), maternal weight, nausea, vomiting, exercise induced asthma, and anxiety. FDLMP: 23. Seen by MFM on 7/10/23 for level 2 ultrasound (BMI) and to evaluate intrauterine mass (echogenic intrauterine mass adjacent to amnion) seen on previous imaging. No mass appreciated--see MFM consult. Medications:  generic celexa, Pantoprazol, prenatal vitamin, and Unisom. Denies vaginal bleeding, leakage of fluid, vaginal discharge and pelvic pain. Admits to fetal movement. Denies headaches, vision changes and RUQ pain. Nausea persists--sometimes, occasional vomiting. Taking Unisom prn. Denies fever, chills, chest pain, shortness of breath, vomiting, diarrhea, constipation, dysuria and hematuria. Maternal Wellness Questionnaire reviewed--no concerns. /3    EFW: 23: 213 g, 90.7%  EFW: 23: 1037 g, 84.7%  EFW: 10/17/23: 2648 g, 86%        OB ULTRASOUND:  BIOPHYSICAL PROFILE     DATE: 2023     PHYSICIAN: MATILDE Urbina D.O.     SONOGRAPHER: Lady Ghazala KITCHEN     INDICATION: Fetal well being     TYPE OF SCAN: abdominal     BPP: 8/8  Tone: 2, Gross fetal movement: 2, Breathin, Fluid: 2     FINDINGS:  A single viable intrauterine pregnancy is noted in Breech presentation. Cardiac and somatic activity are noted. The following values were obtained:              Fetal heart rate 125bpm              Amniotic fluid index 13.44cm     IMPRESSION:   Single live IUP in the third trimester. BPP 8/8. MADISON 13.44cm. Imaging is limited secondary to fetal position. The patient is well aware of the limitations of ultrasound in the detection of anomalies. Diagnosis Orders   1.  Encounter for prenatal care in third trimester of first pregnancy  Culture, Strep B Screen,

## 2023-11-04 LAB — GP B STREP SPEC QL CULT: NORMAL

## 2023-11-05 VITALS — BODY MASS INDEX: 43.39 KG/M2 | WEIGHT: 233.4 LBS | TEMPERATURE: 97.4 F

## 2023-11-05 PROBLEM — Z34.03 ENCOUNTER FOR PRENATAL CARE IN THIRD TRIMESTER OF FIRST PREGNANCY: Status: ACTIVE | Noted: 2023-05-11

## 2023-11-06 ENCOUNTER — ROUTINE PRENATAL (OUTPATIENT)
Dept: OBGYN CLINIC | Age: 28
End: 2023-11-06

## 2023-11-06 ENCOUNTER — TELEPHONE (OUTPATIENT)
Dept: OBGYN CLINIC | Age: 28
End: 2023-11-06

## 2023-11-06 VITALS
WEIGHT: 232.2 LBS | HEART RATE: 101 BPM | SYSTOLIC BLOOD PRESSURE: 112 MMHG | BODY MASS INDEX: 43.16 KG/M2 | DIASTOLIC BLOOD PRESSURE: 72 MMHG

## 2023-11-06 DIAGNOSIS — E66.01 CLASS 3 SEVERE OBESITY DUE TO EXCESS CALORIES WITHOUT SERIOUS COMORBIDITY WITH BODY MASS INDEX (BMI) OF 40.0 TO 44.9 IN ADULT (HCC): ICD-10-CM

## 2023-11-06 DIAGNOSIS — O21.9 NAUSEA AND VOMITING IN PREGNANCY: ICD-10-CM

## 2023-11-06 DIAGNOSIS — O32.0XX0 UNSTABLE FETAL LIE, SINGLE OR UNSPECIFIED FETUS: ICD-10-CM

## 2023-11-06 DIAGNOSIS — Z34.03 ENCOUNTER FOR PRENATAL CARE IN THIRD TRIMESTER OF FIRST PREGNANCY: Primary | ICD-10-CM

## 2023-11-06 PROCEDURE — 0502F SUBSEQUENT PRENATAL CARE: CPT | Performed by: OBSTETRICS & GYNECOLOGY

## 2023-11-06 RX ORDER — AMOXICILLIN 875 MG/1
TABLET, COATED ORAL
COMMUNITY
Start: 2023-11-03

## 2023-11-06 NOTE — TELEPHONE ENCOUNTER
Pt calling to make sure it is ok for RSV vaccine. 36w4d. Pt advised this should be ok. She is having this done at [facility].  Please advise

## 2023-11-06 NOTE — TELEPHONE ENCOUNTER
Pt returned call and stated she is also on Amoxicillin ( for past 3 days ) and wanted to make sure it was still ok to receive this. I Spoke with Dr Amari Arriaga and confirmed ok to get RSV vaccine while on this antibiotic.  DONE

## 2023-11-14 ENCOUNTER — ROUTINE PRENATAL (OUTPATIENT)
Dept: OBGYN CLINIC | Age: 28
End: 2023-11-14

## 2023-11-14 VITALS
BODY MASS INDEX: 43.83 KG/M2 | WEIGHT: 235.8 LBS | HEART RATE: 104 BPM | DIASTOLIC BLOOD PRESSURE: 68 MMHG | SYSTOLIC BLOOD PRESSURE: 110 MMHG

## 2023-11-14 DIAGNOSIS — E66.01 CLASS 3 SEVERE OBESITY DUE TO EXCESS CALORIES WITHOUT SERIOUS COMORBIDITY WITH BODY MASS INDEX (BMI) OF 40.0 TO 44.9 IN ADULT (HCC): ICD-10-CM

## 2023-11-14 DIAGNOSIS — Z34.03 ENCOUNTER FOR PRENATAL CARE IN THIRD TRIMESTER OF FIRST PREGNANCY: Primary | ICD-10-CM

## 2023-11-14 DIAGNOSIS — O32.0XX0 UNSTABLE FETAL LIE, SINGLE OR UNSPECIFIED FETUS: ICD-10-CM

## 2023-11-14 DIAGNOSIS — O32.0XX0: ICD-10-CM

## 2023-11-14 DIAGNOSIS — J45.990 EXERCISE-INDUCED ASTHMA: ICD-10-CM

## 2023-11-14 NOTE — PROGRESS NOTES
Maternal emotional well being screening form completed and reviewed with patient. Current score is 7. Patient given referral to [address] [place] [address] ([phone]):  No

## 2023-11-16 ENCOUNTER — APPOINTMENT (OUTPATIENT)
Dept: ULTRASOUND IMAGING | Age: 28
End: 2023-11-16
Payer: COMMERCIAL

## 2023-11-16 ENCOUNTER — HOSPITAL ENCOUNTER (INPATIENT)
Age: 28
LOS: 5 days | Discharge: HOME | End: 2023-11-21
Attending: OBSTETRICS & GYNECOLOGY | Admitting: OBSTETRICS & GYNECOLOGY
Payer: COMMERCIAL

## 2023-11-16 ENCOUNTER — APPOINTMENT (OUTPATIENT)
Dept: LABOR AND DELIVERY | Age: 28
End: 2023-11-16
Payer: COMMERCIAL

## 2023-11-16 PROBLEM — O32.0XX0: Status: ACTIVE | Noted: 2023-11-16

## 2023-11-16 LAB
ABO + RH BLD: NORMAL
AMPHETAMINES UR QL SCN>1000 NG/ML: NORMAL
BARBITURATES UR QL SCN>200 NG/ML: NORMAL
BASOPHILS # BLD: 0 K/UL (ref 0–0.2)
BASOPHILS NFR BLD: 0.3 %
BENZODIAZ UR QL SCN>200 NG/ML: NORMAL
BLD GP AB SCN SERPL QL: NORMAL
BUPRENORPHINE+NOR UR QL SCN: NORMAL
CANNABINOIDS UR QL SCN>50 NG/ML: NORMAL
COCAINE UR QL SCN: NORMAL
DEPRECATED RDW RBC AUTO: 14.2 % (ref 12.4–15.4)
DRUG SCREEN COMMENT UR-IMP: NORMAL
EOSINOPHIL # BLD: 0.1 K/UL (ref 0–0.6)
EOSINOPHIL NFR BLD: 0.6 %
FENTANYL SCREEN, URINE: NORMAL
HCT VFR BLD AUTO: 31.5 % (ref 36–48)
HGB BLD-MCNC: 10.2 G/DL (ref 12–16)
LYMPHOCYTES # BLD: 2.1 K/UL (ref 1–5.1)
LYMPHOCYTES NFR BLD: 16.8 %
MCH RBC QN AUTO: 26.5 PG (ref 26–34)
MCHC RBC AUTO-ENTMCNC: 32.5 G/DL (ref 31–36)
MCV RBC AUTO: 81.6 FL (ref 80–100)
METHADONE UR QL SCN>300 NG/ML: NORMAL
MONOCYTES # BLD: 0.8 K/UL (ref 0–1.3)
MONOCYTES NFR BLD: 6.2 %
NEUTROPHILS # BLD: 9.6 K/UL (ref 1.7–7.7)
NEUTROPHILS NFR BLD: 76.1 %
OPIATES UR QL SCN>300 NG/ML: NORMAL
OXYCODONE UR QL SCN: NORMAL
PCP UR QL SCN>25 NG/ML: NORMAL
PH UR STRIP: 6 [PH]
PLATELET # BLD AUTO: 227 K/UL (ref 135–450)
PMV BLD AUTO: 9 FL (ref 5–10.5)
RBC # BLD AUTO: 3.86 M/UL (ref 4–5.2)
WBC # BLD AUTO: 12.6 K/UL (ref 4–11)

## 2023-11-16 PROCEDURE — 76805 OB US >/= 14 WKS SNGL FETUS: CPT

## 2023-11-16 PROCEDURE — 86850 RBC ANTIBODY SCREEN: CPT

## 2023-11-16 PROCEDURE — 2580000003 HC RX 258: Performed by: OBSTETRICS & GYNECOLOGY

## 2023-11-16 PROCEDURE — 6370000000 HC RX 637 (ALT 250 FOR IP): Performed by: OBSTETRICS & GYNECOLOGY

## 2023-11-16 PROCEDURE — 6360000002 HC RX W HCPCS: Performed by: OBSTETRICS & GYNECOLOGY

## 2023-11-16 PROCEDURE — 1220000000 HC SEMI PRIVATE OB R&B

## 2023-11-16 PROCEDURE — 85025 COMPLETE CBC W/AUTO DIFF WBC: CPT

## 2023-11-16 PROCEDURE — 80307 DRUG TEST PRSMV CHEM ANLYZR: CPT

## 2023-11-16 PROCEDURE — 99222 1ST HOSP IP/OBS MODERATE 55: CPT | Performed by: OBSTETRICS & GYNECOLOGY

## 2023-11-16 PROCEDURE — 86901 BLOOD TYPING SEROLOGIC RH(D): CPT

## 2023-11-16 PROCEDURE — 86900 BLOOD TYPING SEROLOGIC ABO: CPT

## 2023-11-16 PROCEDURE — 86780 TREPONEMA PALLIDUM: CPT

## 2023-11-16 RX ORDER — ONDANSETRON 4 MG/1
4 TABLET, ORALLY DISINTEGRATING ORAL EVERY 8 HOURS PRN
Status: DISCONTINUED | OUTPATIENT
Start: 2023-11-16 | End: 2023-11-18

## 2023-11-16 RX ORDER — ONDANSETRON 2 MG/ML
4 INJECTION INTRAMUSCULAR; INTRAVENOUS EVERY 6 HOURS PRN
Status: DISCONTINUED | OUTPATIENT
Start: 2023-11-16 | End: 2023-11-18

## 2023-11-16 RX ORDER — ONDANSETRON 2 MG/ML
4 INJECTION INTRAMUSCULAR; INTRAVENOUS EVERY 6 HOURS PRN
Status: DISCONTINUED | OUTPATIENT
Start: 2023-11-16 | End: 2023-11-16 | Stop reason: SDUPTHER

## 2023-11-16 RX ORDER — ACETAMINOPHEN 500 MG
1000 TABLET ORAL EVERY 8 HOURS PRN
Status: DISCONTINUED | OUTPATIENT
Start: 2023-11-16 | End: 2023-11-18

## 2023-11-16 RX ORDER — SODIUM CHLORIDE 9 MG/ML
INJECTION, SOLUTION INTRAVENOUS PRN
Status: DISCONTINUED | OUTPATIENT
Start: 2023-11-16 | End: 2023-11-18

## 2023-11-16 RX ORDER — SODIUM CHLORIDE 0.9 % (FLUSH) 0.9 %
5-40 SYRINGE (ML) INJECTION PRN
Status: DISCONTINUED | OUTPATIENT
Start: 2023-11-16 | End: 2023-11-18

## 2023-11-16 RX ORDER — SODIUM CHLORIDE 0.9 % (FLUSH) 0.9 %
5-40 SYRINGE (ML) INJECTION EVERY 12 HOURS SCHEDULED
Status: DISCONTINUED | OUTPATIENT
Start: 2023-11-16 | End: 2023-11-18

## 2023-11-16 RX ADMIN — ONDANSETRON 4 MG: 2 INJECTION INTRAMUSCULAR; INTRAVENOUS at 09:55

## 2023-11-16 RX ADMIN — Medication 10 ML: at 21:34

## 2023-11-16 RX ADMIN — ACETAMINOPHEN 1000 MG: 500 TABLET ORAL at 20:09

## 2023-11-16 ASSESSMENT — PAIN SCALES - GENERAL: PAINLEVEL_OUTOF10: 3

## 2023-11-16 NOTE — H&P
Department of Obstetrics and Gynecology  Attending Obstetrics History and Physical        CHIEF COMPLAINT: unstable lie    HPI:   33 y/o  female at 45 weeks 0 days gestation with [facility] 23 presents for admission secondary to unstable lie--footling breech. Denies vaginal bleeding, leakage of fluid, vaginal discharge, contractions, and pelvic pain. Admits to fetal movement. Pregnancy is complicated by unstable lie, echogenic intrauterine mass adjacent to amnion (not seen in subsequent imaging--resolved), maternal weight, exercise induced asthma, and anxiety. FDLMP: 23. Seen by MFM on 7/10/23 for level 2 ultrasound (BMI) and to evaluate intrauterine mass (echogenic intrauterine mass adjacent to amnion) seen on previous imaging. No mass appreciated--see MFM consult. Denies headaches, vision changes and RUQ pain. Nausea persists--sometimes, occasional vomiting. Taking Unisom prn. Denies fever, chills, chest pain, shortness of breath, vomiting, diarrhea, constipation, dysuria and hematuria. Maternal Wellness Questionnaire reviewed--no concerns. EFW: 23: 213 g, 90.7%  EFW: 23: 1037 g, 84.7%  EFW: 10/17/23: 2648 g, 86%  EFW: 23: 3183 g, 50.6%      DATES:    Last Menstrual Period:  23  Estimated Due Date:  23    PRENATAL CARE:    Provider:  Mary Renee. RACHID Urbina  [facility]    Blood Type/Rh:  A positive  Antibody Screen:  negative  Rubella:  immune  RPR:  non-reactive  Hepatitis B Surface Antigen: non-reactive  HIV:  non-reactive  Gonorrhea:  negative  Chlamydia:  negative  MSAFP/Multiple Markers:  Date:  ; Results:    U/S Structural Survery:  see report  1 hour Glucose Tolerance Test:  115  Group B Strep:  negative      PAST OB HISTORY        Depression:  No      Post-partum depression:  No      Diabetes:  No      Gestational Diabetes:  No      Thyroid Disease:  No      Chronic HTN:  No      Gestation HTN:  No      Pre-eclampsia:  No      Seizure disorder:  No [facility]  [address] [place] [address] Lab          ASSESSMENT AND PLAN:    The patient is a 34 y.o.  1 parity 0 at 45 weeks 0 days gestation with [facility] 23  Unstable lie--foot presentation  Anemia in pregnancy  Exercise induced asthma  Nausea, vomiting in pregnancy    Discussion:     Ultrasound and cervical check reviewed with patient. Unstable lie persists. Options reviewed:   -Discharge home with very low threshold for return:  contractions, pelvic pain, ROM (option not recommended at this time due to unstable lie/cervical check)  -External cephalic version  -Primary   Risks and benefits reviewed. Plan:   Admission  Intermittent fetal monitoring. Due to gestational age and absence of signs of labor, will tentatively schedule external cephalic version with epidural on 23 closer to 44 weeks--Re: fetal lung maturity concerns. If fetus turns spontaneously to vertex in the interim or version is successful, immediate induction of labor to avoid need for  (increased morbidity, mortality). If unsuccessful external cephalic version on , consider primary  (patient will be s/p epidural). Roz Urbina D.O.    [facility]

## 2023-11-16 NOTE — PROGRESS NOTES
35 y/o  female at 37 weeks 5 days gestation with [facility] 23 presents for prenatal visit and ultrasound  Patient received RSV vaccination  Pregnancy is complicated by breech presentation, unstable lie (foot presentation on cervical check today), echogenic intrauterine mass adjacent to amnion (not seen in subsequent imaging--resolved), maternal weight, exercise induced asthma, and anxiety. FDLMP: 23. Seen by MFM on 7/10/23 for level 2 ultrasound (BMI) and to evaluate intrauterine mass (echogenic intrauterine mass adjacent to amnion) seen on previous imaging. No mass appreciated--see MFM consult. Anatomy survey limited:  Limited views of cavum, lips, palate, orbits/eyes, right outflow, left outflow, aortic arch, ductal arch, 3 vessel view, fingers. Medications:  generic celexa, Pantoprazol, prenatal vitamin, and Unisom. Denies vaginal bleeding, leakage of fluid, vaginal discharge and pelvic pain. Admits to fetal movement. Denies regular contractions. Admits to a lot of cramping. Denies headaches, vision changes and RUQ pain. Nausea persists--some, denies vomiting. Taking Unisom prn. Denies fever, chills, chest pain, shortness of breath, vomiting, diarrhea, constipation, dysuria and hematuria. Maternal Wellness Questionnaire reviewed--no concerns. EFW: 23: 213 g, 90.7%  EFW: 23: 1037 g, 84.7%  EFW: 10/17/23: 2648 g, 86%    Cervical examination: 1/50/-3 foot presentation/breech       OBSTETRIC ULTRASOUND GROWTH     DATE: 23     PHYSICIAN: MATILDE Urbnia D.O.      SONOGRAPHER: CORNELIO Lindsey RDMS     INDICATION: Growth, BPP     TYPE OF SCAN: abdominal     FINDINGS:  A single viable intrauterine pregnancy is noted. Foot presentation is appreciated with bilateral feet noted at the internal cervical os. Cardiac and somatic activity are noted.      The following values were obtained:              Fetal heart rate                                               142bpm              BPD

## 2023-11-17 LAB — REAGIN+T PALLIDUM IGG+IGM SERPL-IMP: NORMAL

## 2023-11-17 PROCEDURE — 2580000003 HC RX 258: Performed by: OBSTETRICS & GYNECOLOGY

## 2023-11-17 PROCEDURE — 99231 SBSQ HOSP IP/OBS SF/LOW 25: CPT | Performed by: OBSTETRICS & GYNECOLOGY

## 2023-11-17 PROCEDURE — 1220000000 HC SEMI PRIVATE OB R&B

## 2023-11-17 RX ADMIN — Medication 10 ML: at 13:34

## 2023-11-17 RX ADMIN — Medication 10 ML: at 21:13

## 2023-11-17 NOTE — PLAN OF CARE
Problem: Vaginal Birth or  Section  Goal: Fetal and maternal status remain reassuring during the birth process  Description:  Birth OB-Pregnancy care plan goal which identifies if the fetal and maternal status remain reassuring during the birth process  Outcome: Progressing     Problem: Pain  Goal: Verbalizes/displays adequate comfort level or baseline comfort level  Outcome: Progressing     Problem: Infection - Adult  Goal: Absence of infection at discharge  Outcome: Progressing  Goal: Absence of infection during hospitalization  Outcome: Progressing  Goal: Absence of fever/infection during anticipated neutropenic period  Outcome: Progressing     Problem: Safety - Adult  Goal: Free from fall injury  2023 by Kelly Mcduffie RN  Outcome: Progressing  2023 0911 by Ova Dance, RN  Outcome: Progressing     Problem: Discharge Planning  Goal: Discharge to home or other facility with appropriate resources  Outcome: Progressing     Problem: Chronic Conditions and Co-morbidities  Goal: Patient's chronic conditions and co-morbidity symptoms are monitored and maintained or improved  Outcome: Progressing

## 2023-11-18 ENCOUNTER — ANESTHESIA (OUTPATIENT)
Dept: LABOR AND DELIVERY | Age: 28
End: 2023-11-18
Payer: COMMERCIAL

## 2023-11-18 ENCOUNTER — ANESTHESIA EVENT (OUTPATIENT)
Dept: LABOR AND DELIVERY | Age: 28
End: 2023-11-18
Payer: COMMERCIAL

## 2023-11-18 PROCEDURE — 2580000003 HC RX 258: Performed by: OBSTETRICS & GYNECOLOGY

## 2023-11-18 PROCEDURE — 99231 SBSQ HOSP IP/OBS SF/LOW 25: CPT | Performed by: OBSTETRICS & GYNECOLOGY

## 2023-11-18 PROCEDURE — 6370000000 HC RX 637 (ALT 250 FOR IP): Performed by: OBSTETRICS & GYNECOLOGY

## 2023-11-18 PROCEDURE — 1220000000 HC SEMI PRIVATE OB R&B

## 2023-11-18 RX ORDER — SODIUM CHLORIDE 0.9 % (FLUSH) 0.9 %
5-40 SYRINGE (ML) INJECTION EVERY 12 HOURS SCHEDULED
Status: DISCONTINUED | OUTPATIENT
Start: 2023-11-19 | End: 2023-11-19

## 2023-11-18 RX ORDER — ONDANSETRON 2 MG/ML
4 INJECTION INTRAMUSCULAR; INTRAVENOUS EVERY 6 HOURS PRN
Status: DISCONTINUED | OUTPATIENT
Start: 2023-11-18 | End: 2023-11-19

## 2023-11-18 RX ORDER — SODIUM CHLORIDE 0.9 % (FLUSH) 0.9 %
10 SYRINGE (ML) INJECTION PRN
Status: DISCONTINUED | OUTPATIENT
Start: 2023-11-18 | End: 2023-11-19

## 2023-11-18 RX ORDER — CEFAZOLIN SODIUM IN 0.9 % NACL 2 G/100 ML
2000 PLASTIC BAG, INJECTION (ML) INTRAVENOUS EVERY 8 HOURS
Status: DISCONTINUED | OUTPATIENT
Start: 2023-11-19 | End: 2023-11-19

## 2023-11-18 RX ORDER — SODIUM CHLORIDE, SODIUM LACTATE, POTASSIUM CHLORIDE, AND CALCIUM CHLORIDE .6; .31; .03; .02 G/100ML; G/100ML; G/100ML; G/100ML
1000 INJECTION, SOLUTION INTRAVENOUS ONCE
Status: DISCONTINUED | OUTPATIENT
Start: 2023-11-19 | End: 2023-11-19

## 2023-11-18 RX ORDER — FAMOTIDINE 10 MG/ML
20 INJECTION, SOLUTION INTRAVENOUS ONCE
Status: DISCONTINUED | OUTPATIENT
Start: 2023-11-19 | End: 2023-11-19

## 2023-11-18 RX ORDER — SODIUM CHLORIDE 9 MG/ML
INJECTION, SOLUTION INTRAVENOUS PRN
Status: DISCONTINUED | OUTPATIENT
Start: 2023-11-18 | End: 2023-11-19

## 2023-11-18 RX ORDER — AZITHROMYCIN 500 MG/1
INJECTION, POWDER, LYOPHILIZED, FOR SOLUTION INTRAVENOUS
Status: DISCONTINUED
Start: 2023-11-18 | End: 2023-11-19

## 2023-11-18 RX ORDER — ACETAMINOPHEN 325 MG/1
975 TABLET ORAL ONCE
Status: DISCONTINUED | OUTPATIENT
Start: 2023-11-19 | End: 2023-11-19

## 2023-11-18 RX ORDER — SODIUM CHLORIDE, SODIUM LACTATE, POTASSIUM CHLORIDE, CALCIUM CHLORIDE 600; 310; 30; 20 MG/100ML; MG/100ML; MG/100ML; MG/100ML
INJECTION, SOLUTION INTRAVENOUS CONTINUOUS
Status: DISCONTINUED | OUTPATIENT
Start: 2023-11-19 | End: 2023-11-19

## 2023-11-18 RX ADMIN — ONDANSETRON 4 MG: 4 TABLET, ORALLY DISINTEGRATING ORAL at 11:28

## 2023-11-18 RX ADMIN — SODIUM CHLORIDE, POTASSIUM CHLORIDE, SODIUM LACTATE AND CALCIUM CHLORIDE 1000 ML: 600; 310; 30; 20 INJECTION, SOLUTION INTRAVENOUS at 23:45

## 2023-11-18 RX ADMIN — Medication 10 ML: at 17:25

## 2023-11-18 RX ADMIN — Medication 10 ML: at 21:07

## 2023-11-18 NOTE — PLAN OF CARE
Problem: Vaginal Birth or  Section  Goal: Fetal and maternal status remain reassuring during the birth process  Description:  Birth OB-Pregnancy care plan goal which identifies if the fetal and maternal status remain reassuring during the birth process  Outcome: Progressing     Problem: Vaginal Birth or  Section  Goal: Fetal and maternal status remain reassuring during the birth process  Description:  Birth OB-Pregnancy care plan goal which identifies if the fetal and maternal status remain reassuring during the birth process  Outcome: Progressing     Problem: Pain  Goal: Verbalizes/displays adequate comfort level or baseline comfort level  Outcome: Progressing     Problem: Infection - Adult  Goal: Absence of infection at discharge  Outcome: Progressing  Goal: Absence of infection during hospitalization  Outcome: Progressing  Goal: Absence of fever/infection during anticipated neutropenic period  Outcome: Progressing     Problem: Safety - Adult  Goal: Free from fall injury  Outcome: Progressing     Problem: Discharge Planning  Goal: Discharge to home or other facility with appropriate resources  Outcome: Progressing     Problem: Chronic Conditions and Co-morbidities  Goal: Patient's chronic conditions and co-morbidity symptoms are monitored and maintained or improved  Outcome: Progressing     Problem: Postpartum  Goal: Experiences normal postpartum course  Description:  Postpartum OB-Pregnancy care plan goal which identifies if the mother is experiencing a normal postpartum course  Outcome: Progressing  Goal: Appropriate maternal -  bonding  Description:  Postpartum OB-Pregnancy care plan goal which identifies if the mother and  are bonding appropriately  Outcome: Progressing  Goal: Establishment of infant feeding pattern  Description:  Postpartum OB-Pregnancy care plan goal which identifies if the mother is establishing a feeding pattern with their   Outcome:

## 2023-11-18 NOTE — PROGRESS NOTES
Pt called RN to room. Pt with c/o vaginal fullness and pressure; and increased vaginal discharge. She describes it as thick and white, not watery. No c/o ctx, pain, or VB. EFM placed. Will notify Dr. Tyler Jones.

## 2023-11-19 LAB
DEPRECATED RDW RBC AUTO: 14.4 % (ref 12.4–15.4)
HCT VFR BLD AUTO: 29.3 % (ref 36–48)
HGB BLD-MCNC: 9.5 G/DL (ref 12–16)
MCH RBC QN AUTO: 26.4 PG (ref 26–34)
MCHC RBC AUTO-ENTMCNC: 32.3 G/DL (ref 31–36)
MCV RBC AUTO: 81.8 FL (ref 80–100)
PLATELET # BLD AUTO: 220 K/UL (ref 135–450)
PMV BLD AUTO: 8.7 FL (ref 5–10.5)
RBC # BLD AUTO: 3.58 M/UL (ref 4–5.2)
WBC # BLD AUTO: 13.3 K/UL (ref 4–11)

## 2023-11-19 PROCEDURE — 99999 PR OFFICE/OUTPT VISIT,PROCEDURE ONLY: CPT | Performed by: OBSTETRICS & GYNECOLOGY

## 2023-11-19 PROCEDURE — 2500000003 HC RX 250 WO HCPCS: Performed by: NURSE ANESTHETIST, CERTIFIED REGISTERED

## 2023-11-19 PROCEDURE — 6360000002 HC RX W HCPCS: Performed by: OBSTETRICS & GYNECOLOGY

## 2023-11-19 PROCEDURE — 2580000003 HC RX 258: Performed by: NURSE ANESTHETIST, CERTIFIED REGISTERED

## 2023-11-19 PROCEDURE — 2580000003 HC RX 258: Performed by: OBSTETRICS & GYNECOLOGY

## 2023-11-19 PROCEDURE — 3609079900 HC CESAREAN SECTION: Performed by: OBSTETRICS & GYNECOLOGY

## 2023-11-19 PROCEDURE — 6360000002 HC RX W HCPCS: Performed by: NURSE ANESTHETIST, CERTIFIED REGISTERED

## 2023-11-19 PROCEDURE — 2709999900 HC NON-CHARGEABLE SUPPLY: Performed by: OBSTETRICS & GYNECOLOGY

## 2023-11-19 PROCEDURE — 2500000003 HC RX 250 WO HCPCS: Performed by: OBSTETRICS & GYNECOLOGY

## 2023-11-19 PROCEDURE — 6360000002 HC RX W HCPCS

## 2023-11-19 PROCEDURE — 3700000000 HC ANESTHESIA ATTENDED CARE: Performed by: OBSTETRICS & GYNECOLOGY

## 2023-11-19 PROCEDURE — 7100000001 HC PACU RECOVERY - ADDTL 15 MIN: Performed by: OBSTETRICS & GYNECOLOGY

## 2023-11-19 PROCEDURE — 3700000001 HC ADD 15 MINUTES (ANESTHESIA): Performed by: OBSTETRICS & GYNECOLOGY

## 2023-11-19 PROCEDURE — 36415 COLL VENOUS BLD VENIPUNCTURE: CPT

## 2023-11-19 PROCEDURE — 85027 COMPLETE CBC AUTOMATED: CPT

## 2023-11-19 PROCEDURE — 6370000000 HC RX 637 (ALT 250 FOR IP): Performed by: OBSTETRICS & GYNECOLOGY

## 2023-11-19 PROCEDURE — 7100000000 HC PACU RECOVERY - FIRST 15 MIN: Performed by: OBSTETRICS & GYNECOLOGY

## 2023-11-19 PROCEDURE — 1220000000 HC SEMI PRIVATE OB R&B

## 2023-11-19 RX ORDER — DIPHENHYDRAMINE HYDROCHLORIDE 50 MG/ML
25 INJECTION INTRAMUSCULAR; INTRAVENOUS EVERY 6 HOURS PRN
Status: DISCONTINUED | OUTPATIENT
Start: 2023-11-19 | End: 2023-11-21 | Stop reason: HOSPADM

## 2023-11-19 RX ORDER — DOCUSATE SODIUM 100 MG/1
100 CAPSULE, LIQUID FILLED ORAL 2 TIMES DAILY
Status: DISCONTINUED | OUTPATIENT
Start: 2023-11-19 | End: 2023-11-21 | Stop reason: HOSPADM

## 2023-11-19 RX ORDER — EPHEDRINE SULFATE 50 MG/ML
INJECTION INTRAVENOUS PRN
Status: DISCONTINUED | OUTPATIENT
Start: 2023-11-19 | End: 2023-11-19 | Stop reason: SDUPTHER

## 2023-11-19 RX ORDER — HYDROMORPHONE HYDROCHLORIDE 1 MG/ML
0.5 INJECTION, SOLUTION INTRAMUSCULAR; INTRAVENOUS; SUBCUTANEOUS
Status: DISCONTINUED | OUTPATIENT
Start: 2023-11-19 | End: 2023-11-21 | Stop reason: HOSPADM

## 2023-11-19 RX ORDER — HYDROXYZINE PAMOATE 25 MG/1
50 CAPSULE ORAL EVERY 8 HOURS PRN
Status: DISCONTINUED | OUTPATIENT
Start: 2023-11-19 | End: 2023-11-21 | Stop reason: HOSPADM

## 2023-11-19 RX ORDER — ENOXAPARIN SODIUM 100 MG/ML
40 INJECTION SUBCUTANEOUS DAILY
Status: DISCONTINUED | OUTPATIENT
Start: 2023-11-20 | End: 2023-11-21 | Stop reason: HOSPADM

## 2023-11-19 RX ORDER — OXYCODONE HYDROCHLORIDE 5 MG/1
10 TABLET ORAL EVERY 6 HOURS PRN
Status: DISCONTINUED | OUTPATIENT
Start: 2023-11-19 | End: 2023-11-21 | Stop reason: HOSPADM

## 2023-11-19 RX ORDER — PROCHLORPERAZINE EDISYLATE 5 MG/ML
INJECTION INTRAMUSCULAR; INTRAVENOUS PRN
Status: DISCONTINUED | OUTPATIENT
Start: 2023-11-19 | End: 2023-11-19 | Stop reason: SDUPTHER

## 2023-11-19 RX ORDER — SODIUM CHLORIDE 9 MG/ML
INJECTION, SOLUTION INTRAVENOUS PRN
Status: DISCONTINUED | OUTPATIENT
Start: 2023-11-19 | End: 2023-11-21 | Stop reason: HOSPADM

## 2023-11-19 RX ORDER — IBUPROFEN 800 MG/1
800 TABLET ORAL EVERY 8 HOURS SCHEDULED
Status: DISCONTINUED | OUTPATIENT
Start: 2023-11-19 | End: 2023-11-21 | Stop reason: HOSPADM

## 2023-11-19 RX ORDER — ACETAMINOPHEN 500 MG
1000 TABLET ORAL EVERY 8 HOURS SCHEDULED
Status: DISCONTINUED | OUTPATIENT
Start: 2023-11-19 | End: 2023-11-21 | Stop reason: HOSPADM

## 2023-11-19 RX ORDER — BUPIVACAINE HYDROCHLORIDE 7.5 MG/ML
INJECTION, SOLUTION INTRASPINAL PRN
Status: DISCONTINUED | OUTPATIENT
Start: 2023-11-19 | End: 2023-11-19 | Stop reason: SDUPTHER

## 2023-11-19 RX ORDER — LANOLIN 100 %
OINTMENT (GRAM) TOPICAL
Status: DISCONTINUED | OUTPATIENT
Start: 2023-11-19 | End: 2023-11-21 | Stop reason: HOSPADM

## 2023-11-19 RX ORDER — HYDROMORPHONE HYDROCHLORIDE 1 MG/ML
0.25 INJECTION, SOLUTION INTRAMUSCULAR; INTRAVENOUS; SUBCUTANEOUS
Status: DISCONTINUED | OUTPATIENT
Start: 2023-11-19 | End: 2023-11-21 | Stop reason: HOSPADM

## 2023-11-19 RX ORDER — KETOROLAC TROMETHAMINE 30 MG/ML
30 INJECTION, SOLUTION INTRAMUSCULAR; INTRAVENOUS EVERY 6 HOURS PRN
Status: DISCONTINUED | OUTPATIENT
Start: 2023-11-19 | End: 2023-11-21

## 2023-11-19 RX ORDER — SIMETHICONE 80 MG
80 TABLET,CHEWABLE ORAL EVERY 6 HOURS PRN
Status: DISCONTINUED | OUTPATIENT
Start: 2023-11-19 | End: 2023-11-21 | Stop reason: HOSPADM

## 2023-11-19 RX ORDER — MORPHINE SULFATE 1 MG/ML
INJECTION, SOLUTION EPIDURAL; INTRATHECAL; INTRAVENOUS PRN
Status: DISCONTINUED | OUTPATIENT
Start: 2023-11-19 | End: 2023-11-19 | Stop reason: SDUPTHER

## 2023-11-19 RX ORDER — ONDANSETRON 2 MG/ML
4 INJECTION INTRAMUSCULAR; INTRAVENOUS EVERY 6 HOURS PRN
Status: DISCONTINUED | OUTPATIENT
Start: 2023-11-19 | End: 2023-11-21 | Stop reason: HOSPADM

## 2023-11-19 RX ORDER — SODIUM CHLORIDE 0.9 % (FLUSH) 0.9 %
5-40 SYRINGE (ML) INJECTION EVERY 12 HOURS SCHEDULED
Status: DISCONTINUED | OUTPATIENT
Start: 2023-11-19 | End: 2023-11-21 | Stop reason: HOSPADM

## 2023-11-19 RX ORDER — METRONIDAZOLE 250 MG/1
500 TABLET ORAL EVERY 8 HOURS SCHEDULED
Status: DISPENSED | OUTPATIENT
Start: 2023-11-19 | End: 2023-11-21

## 2023-11-19 RX ORDER — HYDROXYZINE PAMOATE 25 MG/1
25 CAPSULE ORAL ONCE
Status: COMPLETED | OUTPATIENT
Start: 2023-11-19 | End: 2023-11-19

## 2023-11-19 RX ORDER — SODIUM CHLORIDE, SODIUM LACTATE, POTASSIUM CHLORIDE, CALCIUM CHLORIDE 600; 310; 30; 20 MG/100ML; MG/100ML; MG/100ML; MG/100ML
INJECTION, SOLUTION INTRAVENOUS CONTINUOUS
Status: DISCONTINUED | OUTPATIENT
Start: 2023-11-19 | End: 2023-11-21 | Stop reason: HOSPADM

## 2023-11-19 RX ORDER — ONDANSETRON 4 MG/1
4 TABLET, ORALLY DISINTEGRATING ORAL EVERY 8 HOURS PRN
Status: DISCONTINUED | OUTPATIENT
Start: 2023-11-19 | End: 2023-11-21 | Stop reason: HOSPADM

## 2023-11-19 RX ORDER — SODIUM CHLORIDE 0.9 % (FLUSH) 0.9 %
5-40 SYRINGE (ML) INJECTION PRN
Status: DISCONTINUED | OUTPATIENT
Start: 2023-11-19 | End: 2023-11-21 | Stop reason: HOSPADM

## 2023-11-19 RX ORDER — OXYCODONE HYDROCHLORIDE 5 MG/1
5 TABLET ORAL EVERY 6 HOURS PRN
Status: DISCONTINUED | OUTPATIENT
Start: 2023-11-19 | End: 2023-11-21 | Stop reason: HOSPADM

## 2023-11-19 RX ORDER — CITALOPRAM 20 MG/1
20 TABLET ORAL DAILY
Status: DISCONTINUED | OUTPATIENT
Start: 2023-11-19 | End: 2023-11-21 | Stop reason: HOSPADM

## 2023-11-19 RX ORDER — HYDROXYZINE PAMOATE 25 MG/1
25 CAPSULE ORAL EVERY 8 HOURS PRN
Status: DISCONTINUED | OUTPATIENT
Start: 2023-11-19 | End: 2023-11-19

## 2023-11-19 RX ORDER — SODIUM CHLORIDE, SODIUM LACTATE, POTASSIUM CHLORIDE, CALCIUM CHLORIDE 600; 310; 30; 20 MG/100ML; MG/100ML; MG/100ML; MG/100ML
INJECTION, SOLUTION INTRAVENOUS CONTINUOUS PRN
Status: DISCONTINUED | OUTPATIENT
Start: 2023-11-19 | End: 2023-11-19 | Stop reason: SDUPTHER

## 2023-11-19 RX ORDER — PRENATAL WITH FERROUS FUM AND FOLIC ACID 3080; 920; 120; 400; 22; 1.84; 3; 20; 10; 1; 12; 200; 27; 25; 2 [IU]/1; [IU]/1; MG/1; [IU]/1; MG/1; MG/1; MG/1; MG/1; MG/1; MG/1; UG/1; MG/1; MG/1; MG/1; MG/1
1 TABLET ORAL DAILY
Status: DISCONTINUED | OUTPATIENT
Start: 2023-11-19 | End: 2023-11-21 | Stop reason: HOSPADM

## 2023-11-19 RX ORDER — OXYTOCIN 10 [USP'U]/ML
INJECTION, SOLUTION INTRAMUSCULAR; INTRAVENOUS PRN
Status: DISCONTINUED | OUTPATIENT
Start: 2023-11-19 | End: 2023-11-19 | Stop reason: SDUPTHER

## 2023-11-19 RX ORDER — CEPHALEXIN 250 MG/1
500 CAPSULE ORAL EVERY 8 HOURS SCHEDULED
Status: DISPENSED | OUTPATIENT
Start: 2023-11-19 | End: 2023-11-21

## 2023-11-19 RX ORDER — HYDROXYZINE PAMOATE 25 MG/1
25 CAPSULE ORAL ONCE
Status: DISCONTINUED | OUTPATIENT
Start: 2023-11-19 | End: 2023-11-21 | Stop reason: HOSPADM

## 2023-11-19 RX ORDER — ONDANSETRON 2 MG/ML
INJECTION INTRAMUSCULAR; INTRAVENOUS PRN
Status: DISCONTINUED | OUTPATIENT
Start: 2023-11-19 | End: 2023-11-19 | Stop reason: SDUPTHER

## 2023-11-19 RX ORDER — KETOROLAC TROMETHAMINE 30 MG/ML
INJECTION, SOLUTION INTRAMUSCULAR; INTRAVENOUS
Status: COMPLETED
Start: 2023-11-19 | End: 2023-11-19

## 2023-11-19 RX ORDER — FAMOTIDINE 10 MG/ML
20 INJECTION, SOLUTION INTRAVENOUS 2 TIMES DAILY
Status: DISCONTINUED | OUTPATIENT
Start: 2023-11-19 | End: 2023-11-21 | Stop reason: HOSPADM

## 2023-11-19 RX ORDER — FERROUS SULFATE 325(65) MG
325 TABLET ORAL EVERY OTHER DAY
Status: DISCONTINUED | OUTPATIENT
Start: 2023-11-19 | End: 2023-11-21 | Stop reason: HOSPADM

## 2023-11-19 RX ADMIN — METRONIDAZOLE 500 MG: 250 TABLET ORAL at 12:53

## 2023-11-19 RX ADMIN — DOCUSATE SODIUM 100 MG: 100 CAPSULE, LIQUID FILLED ORAL at 12:51

## 2023-11-19 RX ADMIN — EPHEDRINE SULFATE 25 MG: 50 INJECTION INTRAVENOUS at 00:25

## 2023-11-19 RX ADMIN — KETOROLAC TROMETHAMINE 30 MG: 30 INJECTION, SOLUTION INTRAMUSCULAR; INTRAVENOUS at 06:13

## 2023-11-19 RX ADMIN — Medication 10 ML: at 13:02

## 2023-11-19 RX ADMIN — BUPIVACAINE HYDROCHLORIDE 1.6 ML: 7.5 INJECTION, SOLUTION SUBARACHNOID at 00:10

## 2023-11-19 RX ADMIN — PRENATAL WITH FERROUS FUM AND FOLIC ACID 1 TABLET: 3080; 920; 120; 400; 22; 1.84; 3; 20; 10; 1; 12; 200; 27; 25; 2 TABLET ORAL at 12:53

## 2023-11-19 RX ADMIN — OXYTOCIN 10 UNITS: 10 INJECTION INTRAVENOUS at 00:56

## 2023-11-19 RX ADMIN — METRONIDAZOLE 500 MG: 250 TABLET ORAL at 22:27

## 2023-11-19 RX ADMIN — SODIUM CHLORIDE, POTASSIUM CHLORIDE, SODIUM LACTATE AND CALCIUM CHLORIDE: 600; 310; 30; 20 INJECTION, SOLUTION INTRAVENOUS at 02:00

## 2023-11-19 RX ADMIN — IBUPROFEN 800 MG: 800 TABLET, FILM COATED ORAL at 17:02

## 2023-11-19 RX ADMIN — OXYTOCIN 20 UNITS: 10 INJECTION INTRAVENOUS at 00:35

## 2023-11-19 RX ADMIN — Medication 87.3 MILLI-UNITS/MIN: at 04:02

## 2023-11-19 RX ADMIN — Medication 10 ML: at 21:04

## 2023-11-19 RX ADMIN — Medication 2000 MG: at 00:12

## 2023-11-19 RX ADMIN — PHENYLEPHRINE HYDROCHLORIDE 100 MCG: 10 INJECTION INTRAVENOUS at 00:30

## 2023-11-19 RX ADMIN — PHENYLEPHRINE HYDROCHLORIDE 100 MCG: 10 INJECTION INTRAVENOUS at 00:34

## 2023-11-19 RX ADMIN — SODIUM CHLORIDE, SODIUM LACTATE, POTASSIUM CHLORIDE, AND CALCIUM CHLORIDE: .6; .31; .03; .02 INJECTION, SOLUTION INTRAVENOUS at 00:04

## 2023-11-19 RX ADMIN — CEPHALEXIN 500 MG: 250 CAPSULE ORAL at 12:54

## 2023-11-19 RX ADMIN — KETOROLAC TROMETHAMINE 30 MG: 30 INJECTION, SOLUTION INTRAMUSCULAR; INTRAVENOUS at 12:53

## 2023-11-19 RX ADMIN — CEPHALEXIN 500 MG: 250 CAPSULE ORAL at 22:27

## 2023-11-19 RX ADMIN — ACETAMINOPHEN 1000 MG: 500 TABLET ORAL at 12:52

## 2023-11-19 RX ADMIN — ONDANSETRON 4 MG: 2 INJECTION INTRAMUSCULAR; INTRAVENOUS at 00:26

## 2023-11-19 RX ADMIN — SODIUM CHLORIDE, SODIUM LACTATE, POTASSIUM CHLORIDE, AND CALCIUM CHLORIDE: .6; .31; .03; .02 INJECTION, SOLUTION INTRAVENOUS at 00:56

## 2023-11-19 RX ADMIN — OXYCODONE 5 MG: 5 TABLET ORAL at 18:17

## 2023-11-19 RX ADMIN — MORPHINE SULFATE 0.2 MG: 1 INJECTION EPIDURAL; INTRATHECAL; INTRAVENOUS at 00:10

## 2023-11-19 RX ADMIN — PROCHLORPERAZINE EDISYLATE 10 MG: 5 INJECTION INTRAMUSCULAR; INTRAVENOUS at 00:38

## 2023-11-19 RX ADMIN — FAMOTIDINE 20 MG: 10 INJECTION, SOLUTION INTRAVENOUS at 21:03

## 2023-11-19 RX ADMIN — FERROUS SULFATE TAB 325 MG (65 MG ELEMENTAL FE) 325 MG: 325 (65 FE) TAB at 12:54

## 2023-11-19 RX ADMIN — DOCUSATE SODIUM 100 MG: 100 CAPSULE, LIQUID FILLED ORAL at 21:03

## 2023-11-19 RX ADMIN — CITALOPRAM HYDROBROMIDE 20 MG: 20 TABLET ORAL at 22:27

## 2023-11-19 RX ADMIN — HYDROXYZINE PAMOATE 25 MG: 25 CAPSULE ORAL at 05:25

## 2023-11-19 RX ADMIN — ACETAMINOPHEN 1000 MG: 500 TABLET ORAL at 21:03

## 2023-11-19 RX ADMIN — FAMOTIDINE 20 MG: 10 INJECTION, SOLUTION INTRAVENOUS at 12:55

## 2023-11-19 RX ADMIN — AZITHROMYCIN MONOHYDRATE 500 MG: 500 INJECTION, POWDER, LYOPHILIZED, FOR SOLUTION INTRAVENOUS at 00:12

## 2023-11-19 RX ADMIN — SODIUM CHLORIDE, POTASSIUM CHLORIDE, SODIUM LACTATE AND CALCIUM CHLORIDE: 600; 310; 30; 20 INJECTION, SOLUTION INTRAVENOUS at 08:45

## 2023-11-19 RX ADMIN — HYDROXYZINE PAMOATE 25 MG: 25 CAPSULE ORAL at 07:39

## 2023-11-19 ASSESSMENT — PAIN DESCRIPTION - DESCRIPTORS
DESCRIPTORS: SORE
DESCRIPTORS: ACHING;SHARP
DESCRIPTORS: ACHING

## 2023-11-19 ASSESSMENT — PAIN SCALES - GENERAL
PAINLEVEL_OUTOF10: 0
PAINLEVEL_OUTOF10: 5
PAINLEVEL_OUTOF10: 1
PAINLEVEL_OUTOF10: 2

## 2023-11-19 ASSESSMENT — PAIN DESCRIPTION - LOCATION
LOCATION: ABDOMEN
LOCATION: ABDOMEN;INCISION
LOCATION: ABDOMEN

## 2023-11-19 ASSESSMENT — PAIN DESCRIPTION - ORIENTATION
ORIENTATION: MID
ORIENTATION: LOWER

## 2023-11-19 NOTE — PROGRESS NOTES
Called to pts room for SROM clear fluid. SVE 2/50/-2. Dr. Pia Saunders at bedside. Decision made to proceed with C/S. Risks and benefits dicussed with pt and FOB. All questions answered.

## 2023-11-19 NOTE — PLAN OF CARE
Problem: Pain  Goal: Verbalizes/displays adequate comfort level or baseline comfort level  2023 0834 by Wardell Severin, RN  Outcome: Progressing  2023 0755 by Fernando Solano RN  Outcome: Progressing     Problem: Infection - Adult  Goal: Absence of infection at discharge  2023 0834 by Wardell Severin, RN  Outcome: Progressing  2023 0755 by Fernando Solano RN  Outcome: Progressing  Goal: Absence of infection during hospitalization  2023 0834 by Wardell Severin, RN  Outcome: Progressing  2023 0755 by Fernando Solano RN  Outcome: Progressing  Goal: Absence of fever/infection during anticipated neutropenic period  2023 0834 by Wardell Severin, RN  Outcome: Progressing  2023 0755 by Fernando Solano RN  Outcome: Progressing     Problem: Safety - Adult  Goal: Free from fall injury  2023 0834 by Wardell Severin, RN  Outcome: Progressing  2023 0755 by Fernando Solano RN  Outcome: Progressing     Problem: Discharge Planning  Goal: Discharge to home or other facility with appropriate resources  2023 0834 by Wardell Severin, RN  Outcome: Progressing  2023 0755 by Fernando Solano RN  Outcome: Progressing     Problem: Chronic Conditions and Co-morbidities  Goal: Patient's chronic conditions and co-morbidity symptoms are monitored and maintained or improved  2023 0834 by Wardell Severin, RN  Outcome: Progressing  2023 0755 by Fernando Solano RN  Outcome: Progressing     Problem: Postpartum  Goal: Experiences normal postpartum course  2023 0834 by Wardell Severin, RN  Outcome: Progressing  2023 0755 by Fernando Solano RN  Outcome: Progressing  Goal: Appropriate maternal -  bonding  2023 0834 by Wardell Severin, RN  Outcome: Progressing  2023 0755 by Fernando Solano RN  Outcome: Progressing  Goal: Establishment of infant feeding pattern  2023 08 by Wardell Severin, RN  Outcome:

## 2023-11-19 NOTE — PLAN OF CARE
Problem: Pain  Goal: Verbalizes/displays adequate comfort level or baseline comfort level  11/19/2023 1442 by Saundra Triplett RN  Outcome: Progressing  11/19/2023 0834 by Sandy Jones RN  Outcome: Progressing  11/19/2023 0755 by Savita Mccoy RN  Outcome: Progressing     Problem: Infection - Adult  Goal: Absence of infection at discharge  11/19/2023 0834 by Sandy Jones RN  Outcome: Progressing  11/19/2023 0755 by Savita Mccoy RN  Outcome: Progressing  Goal: Absence of infection during hospitalization  11/19/2023 0834 by Sandy Jones RN  Outcome: Progressing  11/19/2023 0755 by Savita Mccoy RN  Outcome: Progressing  Goal: Absence of fever/infection during anticipated neutropenic period  11/19/2023 0834 by Sandy Jones RN  Outcome: Progressing  11/19/2023 0755 by Savita Mccoy RN  Outcome: Progressing     Problem: Safety - Adult  Goal: Free from fall injury  11/19/2023 1442 by Saundra Triplett RN  Outcome: Progressing  11/19/2023 0834 by Sandy Jones RN  Outcome: Progressing  11/19/2023 0755 by Savita Mccoy RN  Outcome: Progressing     Problem: Discharge Planning  Goal: Discharge to home or other facility with appropriate resources  11/19/2023 0834 by Sandy Jones RN  Outcome: Progressing  11/19/2023 0755 by Savita Mccoy RN  Outcome: Progressing     Problem: Chronic Conditions and Co-morbidities  Goal: Patient's chronic conditions and co-morbidity symptoms are monitored and maintained or improved  11/19/2023 0834 by Sandy Jones RN  Outcome: Progressing  11/19/2023 0755 by Savita Mccoy RN  Outcome: Progressing     Problem: Postpartum  Goal: Experiences normal postpartum course  Description:  Postpartum OB-Pregnancy care plan goal which identifies if the mother is experiencing a normal postpartum course  11/19/2023 1442 by Saundra Triplett RN  Outcome: Progressing  11/19/2023 0834 by Sandy Jones RN  Outcome: Progressing  11/19/2023 0755 by

## 2023-11-19 NOTE — LACTATION NOTE
This note was copied from a baby's chart. Lactation Progress Note      Data:  Initial consult during lactation rounds with primip breast feeder, and 15 hour old baby. Mother is offering the breast on consult, reports that she has been struggling with latching and using a nipple shield. Right nipple noted to be flat, protrudes some with stimulation, red with linear abrasion across nipple. Action: Introduced self as [address] on for the day and offered assistance with latch and permission to help guide baby onto the breast, mother agreeable and consents to help with latching. Praise given for STS contact and breast support. Gave tips to slightly improve baby's position so that she is closer to the breast, providing additional pillow, and more nose to nipple position and explained rationale. Praise given for belly to belly position. Infant arching away from the breast without rooting when mother attempting to latch on. Shown behavior observed to parents, and explained baby may need to be burped. Burping provided, infant then became calm with relaxed body language and minimal rooting. Encouraged to offer the breast again. Educated on the importance of obtaining a good deep comfortable latch onto the breast. Reinforced how to position baby to the breast, how to offer breast support behind the areola, and how to hand express colostrum. Reviewed steps for obtaining ABAD, and encouraged to wait for infant to open mouth widely before attempting to latch on. Several drops of colostrum expressed and fed to infant with mothers permission. Baby rooting with several attempts to latch on, but unsuccessful and baby becoming fussy. Discussed risks vs benefits of using a nipple shield. Encouraged to try with nipple shield if unable to latch without.  Small shield provided and educated on how to apply to nipple by slightly inverting first. Encouraged a deep latch with shield, educating on importance to prevent soreness to nipples and

## 2023-11-19 NOTE — FLOWSHEET NOTE
11/19/23 1156   Handoff   Communication Given Transfer Handoff   Handoff Given To Andres Pain Received From [employer] Face to Face; In department   Time Handoff Given 4443

## 2023-11-19 NOTE — L&D DELIVERY NOTE
Department of Obstetrics and Gynecology   Section Note    Indications: Fetal malpresentation, SROM    Pre-operative Diagnosis:  Kamila Tang is a 34 y.o.  at 38w3d   A pos / RI / GBS neg  Fetal malpresentation  Maternal obesity  Exercise induced asthma  Anxiety    Post-operative Diagnosis:  Kamila Tang is a 34 y.o.  at 38w3d   A pos / RI / GBS neg  Fetal malpresentation  Maternal obesity  Exercise induced asthma  Anxiety    Surgeon: Darrick Pedro DO    Assistants: See operative record    Anesthesia: Spinal    Intake/Output:    EBL: 518 ml  IVF: 1500 ml  UOP: 450 ml     Drains: None                Specimens:   1. Placenta to pathology   2. Cord blood  3. Cord segment    Findings:  Fetal Findings:  1. Clear Amniotic fluid  2. Viable female infant with spontaneous cry and normal tone   3. Delivered in the RSA position  4. APGARS 8 and 9   5. Infant weight 7lbs 5.8oz  5. Intact Placenta with 3 Vessel Cord    Maternal Findings:   1. Normal appearing uterus, tubes and ovaries           Implants: None            Complications:  None                   Condition: Mother and infant are stable to postpartum recovery room     Indications and Consent: Kamila Tang is a 34 y.o. [facility] gp at 38w3d who was admitted for fetal malpresentation and underwent SROM 2023. Risks, benefits and alternatives were reviewed with the patient. Risks include, but are not limited to, infection, bleeding, injury to the uterus and pelvic structures including the fetus, implications for future pregnancies, risk of anesthesia and even death. All questions were answered to the patient's satisfaction. Patient wishes to proceed with primary low transverse  delivery. Consents are signed and in chart. Procedure Details   The patient was taken to the operating room where spinal anesthesia was obtained without difficulty.   Patient was placed on the operating table in the dorsal supine position

## 2023-11-19 NOTE — PLAN OF CARE
Problem: Vaginal Birth or  Section  Goal: Fetal and maternal status remain reassuring during the birth process  Description:  Birth OB-Pregnancy care plan goal which identifies if the fetal and maternal status remain reassuring during the birth process  Outcome: Completed     Problem: Pain  Goal: Verbalizes/displays adequate comfort level or baseline comfort level  Outcome: Progressing     Problem: Infection - Adult  Goal: Absence of infection at discharge  Outcome: Progressing  Goal: Absence of infection during hospitalization  Outcome: Progressing  Goal: Absence of fever/infection during anticipated neutropenic period  Outcome: Progressing     Problem: Discharge Planning  Goal: Discharge to home or other facility with appropriate resources  Outcome: Progressing     Problem: Chronic Conditions and Co-morbidities  Goal: Patient's chronic conditions and co-morbidity symptoms are monitored and maintained or improved  Outcome: Progressing     Problem: Postpartum  Goal: Experiences normal postpartum course  Description:  Postpartum OB-Pregnancy care plan goal which identifies if the mother is experiencing a normal postpartum course  Outcome: Progressing  Goal: Appropriate maternal -  bonding  Description:  Postpartum OB-Pregnancy care plan goal which identifies if the mother and  are bonding appropriately  Outcome: Progressing  Goal: Establishment of infant feeding pattern  Description:  Postpartum OB-Pregnancy care plan goal which identifies if the mother is establishing a feeding pattern with their   Outcome: Progressing  Goal: Incisions, wounds, or drain sites healing without S/S of infection  Outcome: Progressing

## 2023-11-19 NOTE — ANESTHESIA PROCEDURE NOTES
Spinal Block    Patient location during procedure: OR  End time: 11/19/2023 12:10 AM  Reason for block: primary anesthetic  Staffing  Performed: resident/CRNA   Resident/CRNA: Ruthellen Libman, APRN - CRNA  Performed by: Ruthellen Libman, APRN - CRNA  Authorized by: Rylie Duffy MD    Spinal Block  Patient position: sitting  Prep: ChloraPrep  Patient monitoring: cardiac monitor, continuous pulse ox and frequent blood pressure checks  Approach: midline  Location: L3/L4  Provider prep: mask  Local infiltration: lidocaine  Needle  Needle type: Pencan   Needle gauge: 24 G  Needle length: 4 in  Assessment  Swirl obtained: Yes  CSF: clear  Attempts: 1  Hemodynamics: stable  Additional Notes  Pt.  Sitting, sterile prep/drape, 1%Lido @ L3-4, 24ga pencil point needle inserted via introducer, positive clear, free flowing CSF x 4 quad, 1.6ml 0.75% spinal marcaine and duramorph 0.2mg intrathecally   Preanesthetic Checklist  Completed: patient identified, IV checked, site marked, risks and benefits discussed, surgical/procedural consents, equipment checked, pre-op evaluation, timeout performed, anesthesia consent given, oxygen available and monitors applied/VS acknowledged

## 2023-11-20 PROCEDURE — 0502F SUBSEQUENT PRENATAL CARE: CPT | Performed by: OBSTETRICS & GYNECOLOGY

## 2023-11-20 PROCEDURE — 1220000000 HC SEMI PRIVATE OB R&B

## 2023-11-20 PROCEDURE — 6360000002 HC RX W HCPCS: Performed by: OBSTETRICS & GYNECOLOGY

## 2023-11-20 PROCEDURE — 6370000000 HC RX 637 (ALT 250 FOR IP): Performed by: OBSTETRICS & GYNECOLOGY

## 2023-11-20 RX ADMIN — IBUPROFEN 800 MG: 800 TABLET, FILM COATED ORAL at 13:19

## 2023-11-20 RX ADMIN — ENOXAPARIN SODIUM 40 MG: 100 INJECTION SUBCUTANEOUS at 15:47

## 2023-11-20 RX ADMIN — METRONIDAZOLE 500 MG: 250 TABLET ORAL at 05:30

## 2023-11-20 RX ADMIN — CITALOPRAM HYDROBROMIDE 20 MG: 20 TABLET ORAL at 21:43

## 2023-11-20 RX ADMIN — IBUPROFEN 800 MG: 800 TABLET, FILM COATED ORAL at 03:18

## 2023-11-20 RX ADMIN — DOCUSATE SODIUM 100 MG: 100 CAPSULE, LIQUID FILLED ORAL at 09:18

## 2023-11-20 RX ADMIN — PRENATAL WITH FERROUS FUM AND FOLIC ACID 1 TABLET: 3080; 920; 120; 400; 22; 1.84; 3; 20; 10; 1; 12; 200; 27; 25; 2 TABLET ORAL at 09:18

## 2023-11-20 RX ADMIN — OXYCODONE 5 MG: 5 TABLET ORAL at 19:56

## 2023-11-20 RX ADMIN — OXYCODONE 5 MG: 5 TABLET ORAL at 00:27

## 2023-11-20 RX ADMIN — OXYCODONE 5 MG: 5 TABLET ORAL at 13:19

## 2023-11-20 RX ADMIN — METRONIDAZOLE 500 MG: 250 TABLET ORAL at 14:52

## 2023-11-20 RX ADMIN — CEPHALEXIN 500 MG: 250 CAPSULE ORAL at 05:30

## 2023-11-20 RX ADMIN — DOCUSATE SODIUM 100 MG: 100 CAPSULE, LIQUID FILLED ORAL at 21:37

## 2023-11-20 RX ADMIN — IBUPROFEN 800 MG: 800 TABLET, FILM COATED ORAL at 21:34

## 2023-11-20 RX ADMIN — ACETAMINOPHEN 1000 MG: 500 TABLET ORAL at 05:31

## 2023-11-20 RX ADMIN — METRONIDAZOLE 500 MG: 250 TABLET ORAL at 21:34

## 2023-11-20 RX ADMIN — ACETAMINOPHEN 1000 MG: 500 TABLET ORAL at 14:52

## 2023-11-20 RX ADMIN — CEPHALEXIN 500 MG: 250 CAPSULE ORAL at 14:52

## 2023-11-20 RX ADMIN — CEPHALEXIN 500 MG: 250 CAPSULE ORAL at 21:34

## 2023-11-20 RX ADMIN — OXYCODONE 5 MG: 5 TABLET ORAL at 07:25

## 2023-11-20 ASSESSMENT — PAIN DESCRIPTION - ORIENTATION
ORIENTATION: LOWER

## 2023-11-20 ASSESSMENT — PAIN DESCRIPTION - LOCATION
LOCATION: ABDOMEN
LOCATION: ABDOMEN;INCISION
LOCATION: INCISION
LOCATION: ABDOMEN
LOCATION: ABDOMEN;INCISION
LOCATION: ABDOMEN

## 2023-11-20 ASSESSMENT — PAIN - FUNCTIONAL ASSESSMENT
PAIN_FUNCTIONAL_ASSESSMENT: ACTIVITIES ARE NOT PREVENTED

## 2023-11-20 ASSESSMENT — PAIN SCALES - GENERAL
PAINLEVEL_OUTOF10: 5
PAINLEVEL_OUTOF10: 6
PAINLEVEL_OUTOF10: 5
PAINLEVEL_OUTOF10: 4
PAINLEVEL_OUTOF10: 1

## 2023-11-20 ASSESSMENT — PAIN DESCRIPTION - DESCRIPTORS
DESCRIPTORS: CRAMPING
DESCRIPTORS: ACHING;SORE
DESCRIPTORS: DISCOMFORT;BURNING
DESCRIPTORS: ACHING;SORE
DESCRIPTORS: SORE
DESCRIPTORS: SORE

## 2023-11-20 NOTE — LACTATION NOTE
This note was copied from a baby's chart. Lactation Progress Note      Data:   Baby coming off the breast on consult. Mother reports baby cluster feeding but doing well. Colostrum pooled in nipple shield with release from the breast.       Action: Fed infant colostrum from shield. Assisted with repositioning to the left breast and assisted with application of shield. Infant fussy at the breast. Gave tips for calming and allowed infant to suck on gloved finger to transition to the breast. Noted heart shape to tongue on exam and disorganized sucking. Tabby 6. Radha Vegas is disorganized at first, but coordinated well with SRS after suck training provided. Once calmed, infant transferred to the breast and able to obtain ABAD with shield with SRS. Mother confirms that the latch is comfortable. Shown reassuring signs of deep latch, and educated on what to look for with signs of effective feeding. Discussed observations with Tabby scoring, and encouraged to have pediatrician evaluate when rounding. Discussed how tongue tie may affect breastfeeding relationship depending on severity of restriction and red flags to monitor for that may indicate need for revision. Reassured of normalcy of cluster feeding behaviors and how often to offer the breast. Discussed ongoing importance of deep latching to promote healing to sore nipples, using comfort care measures as needed to soothe between feedings. Name and number remains on whiteboard. Encouraged to call for f/u support prn. Response: Verbalized understanding of teaching provided. Pleased with comfortable latch with this feeding.  Will call for f/u support prn.           TABBY SCORE:______________6________        Scoring of TABBY tool  A score of:   8 indicates normal tongue function;   6 or 7 are considered as borderline: suggest a                              'wait and see' approach with support for  breastfeeding positioning & attachment;   5 or below suggests that there is

## 2023-11-20 NOTE — LACTATION NOTE
This note was copied from a baby's chart. Lactation Progress Note      Data:    Follow up consult for primip on day 1 po with an infant born at 38.3 weeks gestation. MOB reports breastfeeding has been going ok. At time of consult, MOB was beginning to try to latch infant at left breast in football position with nipple shield, stating infant is beginning to do some cluster feeding. Action:    Introduced self & ensured name & lactation # is on whiteboard in room. Offered support, MOB agreeable. Observed mothers technique, father assisting. Parents able to position infant and apply nipple shield correctly independently. Infant still rooting even when breast is in mouth. Infant became very fussy. Suggested parents check diaper. Diaper wet. Once changed, assisted mother reposition infant and try again. Observed infants tongue and tongue tie noted. Discuss this with parents and encouraged parents to discuss with pediatrician to determine if frenotomy is warranted. After a few tries, infant was able to get a ABAD. Suggested mother turn infant inward towards her to allow ear, shoulder, and hip to be in better alignment. Infant remained at the breast after consult ended. Reviewed breastfeeding education, what to expect with cluster feeding, how the breasts work to make milk, breastfeeding recommendations for exclusivity and duration, tips for weaning from nipple shield and to bring in a good milk supply, and reviewed infant feeding cues. Encouraged mother to allow infant to breast feed on demand anytime feeding cues are shown and if no feeding cues are shown to attempt to wake infant to feed every 2-3 hours with a minimum of 8-12 feeds a day per 24 hour period. Encouraged mother to do some hand expression after feeding with nipple shield to help bring in good milk supply, as sometimes not as much milk transfers with nipple shield. Breast feeding log reviewed, all questions answered.  Mother encouraged to call lactation for F/U care as needed. Response:    MOB verbalized an understanding of education provided and will call for assistance as needed.

## 2023-11-20 NOTE — LACTATION NOTE
This note was copied from a baby's chart. Lactation Progress Note      Data:     Patient requesting [address] assistance with breast feeding. Parents state that baby has only latched with a nipple shield but is sleepy now. Output established. Action: LC reassured of normal feeding behavior of the first few days. Assisted with good position skin to skin at breast. Mob expressed colostrum to encourage feed. Baby rooting and could latch for a few sucks but could not sustain the latch to the flat tight nipple. Eventually used the nipple shield to achieve a good deep comfortable latch. LC reinforced proper use of shield and that it should be used temporarily. Baby fed for a good but short feed. Nipple was mariela and colostrum was noted in the shield after the feed. BF education reviewed. Offered f/u LC support prn. LC number on board. Response: Pleased with breast feed. Verbalized and demonstrated understanding. Will call for f/u as needed.

## 2023-11-20 NOTE — PLAN OF CARE
Problem: Pain  Goal: Verbalizes/displays adequate comfort level or baseline comfort level  11/19/2023 1947 by Irma Jack RN  Outcome: Progressing  11/19/2023 1442 by Jose Stokes RN  Outcome: Progressing  11/19/2023 0834 by Lico Trejo RN  Outcome: Progressing  11/19/2023 0755 by Josee Garcia RN  Outcome: Progressing     Problem: Infection - Adult  Goal: Absence of infection at discharge  11/19/2023 1947 by Irma Jack RN  Outcome: Progressing  11/19/2023 0834 by Lico Trejo RN  Outcome: Progressing  11/19/2023 0755 by Josee Garcia RN  Outcome: Progressing  Goal: Absence of infection during hospitalization  11/19/2023 1947 by Irma Jack RN  Outcome: Progressing  11/19/2023 0834 by Lico Trejo RN  Outcome: Progressing  11/19/2023 0755 by Josee Garcia RN  Outcome: Progressing  Goal: Absence of fever/infection during anticipated neutropenic period  11/19/2023 1947 by Irma Jack RN  Outcome: Progressing  11/19/2023 0834 by Lico Trejo RN  Outcome: Progressing  11/19/2023 0755 by Josee Garcia RN  Outcome: Progressing     Problem: Safety - Adult  Goal: Free from fall injury  11/19/2023 1947 by Irma Jack RN  Outcome: Progressing  11/19/2023 1442 by Jose Stokes RN  Outcome: Progressing  11/19/2023 0834 by Lico Trejo RN  Outcome: Progressing  11/19/2023 0755 by Josee Garcia RN  Outcome: Progressing     Problem: Discharge Planning  Goal: Discharge to home or other facility with appropriate resources  11/19/2023 1947 by Irma Jack RN  Outcome: Progressing  11/19/2023 0834 by Lico Trejo RN  Outcome: Progressing  11/19/2023 0755 by Josee Garcia RN  Outcome: Progressing     Problem: Chronic Conditions and Co-morbidities  Goal: Patient's chronic conditions and co-morbidity symptoms are monitored and maintained or improved  11/19/2023 1947 by Irma Jack

## 2023-11-20 NOTE — FLOWSHEET NOTE
11/19/23 1920   Handoff   Communication Given Shift Handoff   Handoff Given To [employer] Received From Fredrich Meckel RN   Handoff Communication Face to Face; In department   Time 1159 Leonid Grimaldo

## 2023-11-20 NOTE — PLAN OF CARE
Problem: Pain  Goal: Verbalizes/displays adequate comfort level or baseline comfort level  2023 1608 by Alexandra Wright RN  Outcome: Progressing  2023 1428 by Alexandra Wright RN  Outcome: Progressing     Problem: Infection - Adult  Goal: Absence of infection at discharge  2023 1608 by Alexandra Wright RN  Outcome: Progressing  2023 1428 by Alexandra Wright RN  Outcome: Progressing  Goal: Absence of infection during hospitalization  2023 1608 by Alexandra Wright RN  Outcome: Progressing  2023 1428 by Alexandra Wright RN  Outcome: Progressing  Goal: Absence of fever/infection during anticipated neutropenic period  2023 1608 by Alexandra Wright RN  Outcome: Progressing  2023 1428 by Alexandra Wright RN  Outcome: Progressing     Problem: Safety - Adult  Goal: Free from fall injury  2023 1608 by Alexandra Wright RN  Outcome: Progressing  2023 1428 by Alexandra Wright RN  Outcome: Progressing     Problem: Discharge Planning  Goal: Discharge to home or other facility with appropriate resources  2023 1608 by Alexandra Wright RN  Outcome: Progressing  2023 1428 by Alexandra Wright RN  Outcome: Progressing     Problem: Chronic Conditions and Co-morbidities  Goal: Patient's chronic conditions and co-morbidity symptoms are monitored and maintained or improved  2023 1608 by Alexandra Wright RN  Outcome: Progressing  2023 1428 by Alexandra Wright RN  Outcome: Progressing     Problem: Postpartum  Goal: Experiences normal postpartum course  Description:  Postpartum OB-Pregnancy care plan goal which identifies if the mother is experiencing a normal postpartum course  2023 1608 by Alexandra Wright RN  Outcome: Progressing  2023 1428 by Alexandra Wright RN  Outcome: Progressing  Goal: Appropriate maternal -  bonding  Description:  Postpartum OB-Pregnancy care plan goal which identifies if the mother and  are bonding

## 2023-11-20 NOTE — FLOWSHEET NOTE
11/20/23 0720   Handoff   Communication Given Shift Handoff   Handoff Given To [address] [place] [address] Dr BANDA   Handoff Received From [facility] Communication Face to Face; In department   Time Handoff Given 0720   End of Shift Check Performed Yes

## 2023-11-20 NOTE — ANESTHESIA POSTPROCEDURE EVALUATION
Department of Anesthesiology  Postprocedure Note    Patient: Shabbir Schmidt  MRN: [id number]  YOB: 1995  Date of evaluation: 2023      Procedure Summary     Date: 23 Room / Location: [address] [place] [address]&D [address]    Anesthesia Start: 0004 Anesthesia Stop: 0123    Procedure:  SECTION (Abdomen) Diagnosis:       Breech presentation, single or unspecified fetus      (Breech presentation, single or unspecified fetus [O32.1XX0])    Surgeons: Kierra Whitney DO Responsible Provider: Huma Gaines MD    Anesthesia Type: spinal ASA Status: 2          Anesthesia Type: No value filed.     Kevin Phase I: Kevin Score: 9    Kevin Phase II: Kevin Score: 10      Anesthesia Post Evaluation    Patient location during evaluation: PACU  Patient participation: complete - patient participated  Level of consciousness: awake and alert  Pain score: 2  Airway patency: patent  Nausea & Vomiting: no vomiting  Complications: no  Cardiovascular status: blood pressure returned to baseline  Respiratory status: acceptable  Hydration status: euvolemic  Multimodal analgesia pain management approach  Pain management: adequate

## 2023-11-20 NOTE — PROGRESS NOTES
Assumed care of couplet at this time. Placed name and number on wall.  awake at this time. Lights dimmed and pt and infant resting quielty. Lyndsey Bolanos RN

## 2023-11-21 VITALS
SYSTOLIC BLOOD PRESSURE: 120 MMHG | TEMPERATURE: 97.8 F | WEIGHT: 235 LBS | DIASTOLIC BLOOD PRESSURE: 76 MMHG | OXYGEN SATURATION: 97 % | RESPIRATION RATE: 16 BRPM | BODY MASS INDEX: 44.37 KG/M2 | HEART RATE: 81 BPM | HEIGHT: 61 IN

## 2023-11-21 PROCEDURE — 6360000002 HC RX W HCPCS: Performed by: OBSTETRICS & GYNECOLOGY

## 2023-11-21 PROCEDURE — 99024 POSTOP FOLLOW-UP VISIT: CPT | Performed by: OBSTETRICS & GYNECOLOGY

## 2023-11-21 PROCEDURE — 6370000000 HC RX 637 (ALT 250 FOR IP): Performed by: OBSTETRICS & GYNECOLOGY

## 2023-11-21 RX ORDER — IBUPROFEN 800 MG/1
800 TABLET ORAL EVERY 8 HOURS SCHEDULED
Qty: 120 TABLET | Refills: 3 | Status: SHIPPED | OUTPATIENT
Start: 2023-11-21

## 2023-11-21 RX ORDER — OXYCODONE HYDROCHLORIDE 5 MG/1
5 TABLET ORAL EVERY 6 HOURS PRN
Qty: 10 TABLET | Refills: 0 | Status: SHIPPED | OUTPATIENT
Start: 2023-11-21 | End: 2023-11-24

## 2023-11-21 RX ADMIN — IBUPROFEN 800 MG: 800 TABLET, FILM COATED ORAL at 05:09

## 2023-11-21 RX ADMIN — DOCUSATE SODIUM 100 MG: 100 CAPSULE, LIQUID FILLED ORAL at 10:08

## 2023-11-21 RX ADMIN — ACETAMINOPHEN 1000 MG: 500 TABLET ORAL at 01:09

## 2023-11-21 RX ADMIN — ENOXAPARIN SODIUM 40 MG: 100 INJECTION SUBCUTANEOUS at 10:09

## 2023-11-21 RX ADMIN — FERROUS SULFATE TAB 325 MG (65 MG ELEMENTAL FE) 325 MG: 325 (65 FE) TAB at 10:08

## 2023-11-21 RX ADMIN — PRENATAL WITH FERROUS FUM AND FOLIC ACID 1 TABLET: 3080; 920; 120; 400; 22; 1.84; 3; 20; 10; 1; 12; 200; 27; 25; 2 TABLET ORAL at 10:08

## 2023-11-21 RX ADMIN — OXYCODONE 5 MG: 5 TABLET ORAL at 06:44

## 2023-11-21 RX ADMIN — ACETAMINOPHEN 1000 MG: 500 TABLET ORAL at 10:08

## 2023-11-21 ASSESSMENT — PAIN DESCRIPTION - ORIENTATION
ORIENTATION: LOWER

## 2023-11-21 ASSESSMENT — PAIN DESCRIPTION - LOCATION
LOCATION: ABDOMEN
LOCATION: INCISION
LOCATION: ABDOMEN;INCISION
LOCATION: ABDOMEN;INCISION

## 2023-11-21 ASSESSMENT — PAIN DESCRIPTION - DESCRIPTORS
DESCRIPTORS: SORE
DESCRIPTORS: SORE
DESCRIPTORS: SORE;CRAMPING

## 2023-11-21 ASSESSMENT — PAIN SCALES - GENERAL
PAINLEVEL_OUTOF10: 5
PAINLEVEL_OUTOF10: 3
PAINLEVEL_OUTOF10: 4
PAINLEVEL_OUTOF10: 3

## 2023-11-21 ASSESSMENT — PAIN - FUNCTIONAL ASSESSMENT
PAIN_FUNCTIONAL_ASSESSMENT: ACTIVITIES ARE NOT PREVENTED

## 2023-11-21 NOTE — PLAN OF CARE
Problem: Pain  Goal: Verbalizes/displays adequate comfort level or baseline comfort level  11/20/2023 1946 by Anat Doe RN  Outcome: Progressing  11/20/2023 1608 by Shanelle Miek RN  Outcome: Progressing  11/20/2023 1428 by Shanelle Mike RN  Outcome: Progressing     Problem: Infection - Adult  Goal: Absence of infection at discharge  11/20/2023 1946 by Anat Doe RN  Outcome: Progressing  11/20/2023 1608 by Shanelle Mike RN  Outcome: Progressing  11/20/2023 1428 by Shanelle Mike RN  Outcome: Progressing  Goal: Absence of infection during hospitalization  11/20/2023 1946 by Anat Doe RN  Outcome: Progressing  11/20/2023 1608 by Shanelle Mike RN  Outcome: Progressing  11/20/2023 1428 by Shanelle Mike RN  Outcome: Progressing  Goal: Absence of fever/infection during anticipated neutropenic period  11/20/2023 1946 by Anat Doe RN  Outcome: Progressing  11/20/2023 1608 by Shanelle Mike RN  Outcome: Progressing  11/20/2023 1428 by Shanelle Mike RN  Outcome: Progressing     Problem: Safety - Adult  Goal: Free from fall injury  11/20/2023 1946 by Anat Doe RN  Outcome: Progressing  11/20/2023 1608 by Shanelle Mike RN  Outcome: Progressing  11/20/2023 1428 by Shanelle Mike RN  Outcome: Progressing     Problem: Discharge Planning  Goal: Discharge to home or other facility with appropriate resources  11/20/2023 1946 by Anat Doe RN  Outcome: Progressing  11/20/2023 1608 by Shanelle Mike RN  Outcome: Progressing  11/20/2023 1428 by Shanelle Mike RN  Outcome: Progressing     Problem: Chronic Conditions and Co-morbidities  Goal: Patient's chronic conditions and co-morbidity symptoms are monitored and maintained or improved  11/20/2023 1946 by Anat Doe RN  Outcome: Progressing  11/20/2023 1608 by Shanelle Mike RN  Outcome: Progressing  11/20/2023 1428 by Shanelle Mike RN  Outcome: Progressing     Problem: Postpartum  Goal: Experiences

## 2023-11-21 NOTE — DISCHARGE INSTRUCTIONS
Thank you for the opportunity to care for you and your family. We hope that you are happy with the care we provided during your stay in the [facility]. We want to ensure that you have the help that you need when you leave the hospital. If there is anything that we can assist you with please let us know. Breastfeeding mothers may contact our lactation specialists with any problems or questions. [facility] lactation services phone number is [phone]. Leave a message and your call will be returned. Please refer to the information provided in the postpartum care booklet. The following are warning signs to remember. Call 911 if you have:    Chest pain or pressure  Shortness of breath, even at rest  Thoughts of hurting yourself or others  Seizures    Call your healthcare provider if you have:    Temperature of 100.4 degrees or higher  Stitches that are not healing             --Swelling, bleeding, drainage, foul odor, redness or warmth in/around your                 stitches, staples, or incision (scar)               -- Bad smelling blood or discharge from the vagina  Vaginal bleeding that has increased             --Soaking through one pad in an hour             --You are passing clots larger than the size of a lemon. Red, warm tender area(s) in your breast or calf. Headache that does not get better, even after taking medicine; or headache with vision changes    Remember to notify all healthcare providers from your date of delivery up to one year after birth! CARING FOR YOURSELF      DIET/ACTIVITY    Eat a well balanced diet focusing on food high in fiber and protein. Drink plenty of fluids, especially water. To avoid constipation you may take a mild stool softener as recommended by your doctor or midwife. Gradually increase your activity. Resume an exercise regime only after being advised by your doctor or midwife.   When sitting or lying down, keep your legs elevated to

## 2023-11-21 NOTE — PROGRESS NOTES
Pt transported to front entrance via wheelchair with infant in arms. Infant placed in car seat by FOB. New pacifier provided per pt request.     Pt discharged home at 1249.

## 2023-11-21 NOTE — LACTATION NOTE
This note was copied from a baby's chart. Lactation Progress Note      Data:     F/U on primip breast feeder who will be d/c home today. Mob states that baby has been feeding well. Baby currently at breast with good position and latch using a nipple shield correctly. Output and weight loss are WNL. Action: Discharge teaching done; what to expect in the first few days of life, to feed baby at first sign of hunger cue for total of 8-12 times per day after the first DOL, how to properly position and latch baby, how to know baby is getting enough, engorgement prevention and treatment, avoiding bottles and pacifiers, community resources, pumping and return to work. Reviewed education regarding shield use and weaning from it. Encouraged to call [facility] or [facility] for f/u prn. Response: Parents verbalized understanding and are comfortable with breast feeding for d/c.

## 2023-11-21 NOTE — PROGRESS NOTES
Discharge instructions reviewed with pt and . Verbalized understanding, all questions answered. Pt aware to call and schedule follow up appointment. FOB to  meds from outpatient pharmacy.

## 2023-11-21 NOTE — PROGRESS NOTES
Discharge Phone Call    Patient Name: Jamal Wilson     [facility] Care Provider: Yony Bhandari DO Discharge Date: 2023    Disposition of baby:    Phone Number: [phone] (home)     Attempts to Contact:  Date:    Caller  Date:    Caller  Date:    Caller    Information for the patient's :  Felisha Niño [[id number]]   Delivery Method: , Low Transverse     1. Now that you are at home is your pain being well controlled? Y/N   If no, instruct to call       provider. 2. Are you breastfeeding? Y/N    Do you need any extra support from our lactation staff? Y/N    If yes, provide number for lactation. 3. Have you made or already had your first appointment with the baby's doctor? Y/N   If no, do      you know when to schedule it? Y/N    4. Have you scheduled your follow-up appointment? Y/N  If no, do you know when to schedule       it? Y/N   If no, they can find it on printed discharge instructions. 5. Did staff discuss safe sleep during your stay? Y/N   6. Did we explain things in a way you could understand? Y/N  7. Were we respectful of your preferences for labor and birth and include you in the plan of       care? Y/N  If no, please explain _______________________________________________  8. Is there anyone in particular you would like to mention who provided care for you? _______      _________________________________________________________________________     9. Were you given a Post-Birth Warning Signs handout? Y/N  Do you have it somewhere      easily accessible? Y/N  If no, please send them a copy and ask them to put it somewhere      easily found. 10. Have you been crying excessively, having anger or mood swings that feel out of control, or       feel like you can't cope with caring for yourself or baby? Y/N   If yes, they may be showing       signs of postpartum depression and should call provider.  There is also a        depression test on page C5 in their discharge booklet they can take. 13. Do you have any other questions or concerns I can address today?  Y/N  ______________      _________________________________________________________________________    Information provided during call :_________________________________________________  ___________________________________________________________________________    Call completed by:____________________________    Date:_________ Time:___________

## 2023-11-21 NOTE — PROGRESS NOTES
Patient's 20 mg dose of celexa found sitting on nurse  this evening. Per patient, she did not receive dose this morning as she takes it nightly. Verified with RN RHINA Loera, patient did not receive dose this morning and it was accidentally scanned into the [facility] adjusted. Messaged pharmacy to re-time dose for nightly at 2100. Will administer dose this evening with nighttime meds.

## 2023-11-21 NOTE — DISCHARGE SUMMARY
ball.  Red streaks or increased swelling of legs, painful red streaks on your breast.  Painful urination, constipation and increased pain or swelling or discharge with your incision. If you feel extremely anxious or overwhelmed. If you have thoughts of harming yourself and/or your baby. Pain Management:     Pain Management:   Take Acetaminophen (Tylenol) or Ibuprofen (Advil, Motrin), as directed for pain. Use a warm Sitz bath 3 times daily to relieve hemorrhoidal discomfort. Heating pad to  incision as needed. For hemorrhoidal discomfort, use Tucks and Anusol cream as needed and directed. Follow-Up Care: These are general instructions for a healthy lifestyle:    No smoking/ No tobacco products/ Avoid exposure to second hand smoke    Surgeon General's Warning:  Quitting smoking now greatly reduces serious risk to your health. Obesity, smoking, and sedentary lifestyle greatly increases your risk for illness    A healthy diet, regular physical exercise & weight monitoring are important for maintaining a healthy lifestyle    Recognize signs and symptoms of STROKE:    F-face looks uneven    A-arms unable to move or move unevenly    S-speech slurred or non-existent    T-time-call 911 as soon as signs and symptoms begin-DO NOT go       Back to bed or wait to see if you get better-TIME IS BRAIN.       Concepción Garcia,    PGY-1  [address] [facility] [employer] [facility] Medicine Residency

## 2023-11-28 ENCOUNTER — POSTPARTUM VISIT (OUTPATIENT)
Dept: OBGYN CLINIC | Age: 28
End: 2023-11-28

## 2023-11-28 VITALS
SYSTOLIC BLOOD PRESSURE: 121 MMHG | OXYGEN SATURATION: 99 % | BODY MASS INDEX: 41.95 KG/M2 | HEART RATE: 89 BPM | DIASTOLIC BLOOD PRESSURE: 78 MMHG | TEMPERATURE: 98.9 F | WEIGHT: 222 LBS

## 2023-11-28 DIAGNOSIS — F41.1 GAD (GENERALIZED ANXIETY DISORDER): ICD-10-CM

## 2023-11-28 PROCEDURE — 0503F POSTPARTUM CARE VISIT: CPT | Performed by: OBSTETRICS & GYNECOLOGY

## 2023-12-05 PROBLEM — O32.0XX0 UNSTABLE LIE OF FETUS: Status: RESOLVED | Noted: 2023-11-06 | Resolved: 2023-12-05

## 2023-12-05 PROBLEM — O21.9 NAUSEA AND VOMITING IN PREGNANCY: Status: RESOLVED | Noted: 2023-07-27 | Resolved: 2023-12-05

## 2023-12-05 PROBLEM — Z34.03 ENCOUNTER FOR PRENATAL CARE IN THIRD TRIMESTER OF FIRST PREGNANCY: Status: RESOLVED | Noted: 2023-05-11 | Resolved: 2023-12-05

## 2023-12-05 PROBLEM — E66.813 CLASS 3 SEVERE OBESITY DUE TO EXCESS CALORIES WITHOUT SERIOUS COMORBIDITY WITH BODY MASS INDEX (BMI) OF 40.0 TO 44.9 IN ADULT: Status: RESOLVED | Noted: 2022-07-29 | Resolved: 2023-12-05

## 2023-12-05 PROBLEM — O32.0XX0: Status: RESOLVED | Noted: 2023-11-16 | Resolved: 2023-12-05

## 2023-12-05 PROBLEM — E66.01 CLASS 3 SEVERE OBESITY DUE TO EXCESS CALORIES WITHOUT SERIOUS COMORBIDITY WITH BODY MASS INDEX (BMI) OF 40.0 TO 44.9 IN ADULT (HCC): Status: RESOLVED | Noted: 2022-07-29 | Resolved: 2023-12-05

## 2024-01-09 ENCOUNTER — POSTPARTUM VISIT (OUTPATIENT)
Dept: OBGYN CLINIC | Age: 29
End: 2024-01-09

## 2024-01-09 VITALS
HEART RATE: 86 BPM | WEIGHT: 228 LBS | BODY MASS INDEX: 43.05 KG/M2 | HEIGHT: 61 IN | TEMPERATURE: 97.9 F | DIASTOLIC BLOOD PRESSURE: 81 MMHG | OXYGEN SATURATION: 98 % | SYSTOLIC BLOOD PRESSURE: 121 MMHG

## 2024-01-09 DIAGNOSIS — Z30.011 ENCOUNTER FOR INITIAL PRESCRIPTION OF CONTRACEPTIVE PILLS: ICD-10-CM

## 2024-01-09 RX ORDER — ACETAMINOPHEN AND CODEINE PHOSPHATE 120; 12 MG/5ML; MG/5ML
1 SOLUTION ORAL DAILY
Qty: 90 TABLET | Refills: 3 | Status: SHIPPED | OUTPATIENT
Start: 2024-01-09

## 2024-02-19 ENCOUNTER — TELEPHONE (OUTPATIENT)
Dept: OBGYN CLINIC | Age: 29
End: 2024-02-19

## 2024-02-19 NOTE — TELEPHONE ENCOUNTER
Pt calling [facility] same day evaluation. She says she has an area to her pubic that is painful. No drainage present. No fever. It is painful if clothes rub against it or any pressure. She says she noticed it Saturday and it has gor worse. Please advise

## 2024-02-20 NOTE — TELEPHONE ENCOUNTER
Attempted to call again, mail box is full. Offering same day appointment @ [facility] with Dr Urbina at 10:30 am today.

## 2024-02-20 NOTE — TELEPHONE ENCOUNTER
Mail box is full and unable to accept any messages at this time.   Full consult dictated    Plan:  47 yo M with h/o HTN, HLD, alcohol use, h/o pancreatitis admitted with c/o abdominal pain; n/v. [id number], AST//337, CT abd/pelvis with prominent CBD 9.5mm, similar to prior CT back in 2021. U/S abd with normal CBD.., Will maintain NPO; Lipase in AM. Will consider MRCP as outpatient.

## 2024-02-28 ENCOUNTER — TELEPHONE (OUTPATIENT)
Dept: FAMILY MEDICINE CLINIC | Age: 29
End: 2024-02-28

## 2024-02-28 NOTE — TELEPHONE ENCOUNTER
Received phone call from the pt in regards to she was at work using the restroom, went to use the toilet paper that had been on top of a metal canister when she realized pt had used TP that was soiled with dried blood. Pt concerned that this could harm, effect her.     Placed phone call on hold. Spoke with josue, PCP she stated that if the blood was dried that the pt should be fine.     Pt advised phone call ended

## 2024-03-13 DIAGNOSIS — F41.1 GAD (GENERALIZED ANXIETY DISORDER): ICD-10-CM

## 2024-03-13 RX ORDER — CITALOPRAM 20 MG/1
TABLET ORAL
Qty: 90 TABLET | Refills: 1 | Status: SHIPPED | OUTPATIENT
Start: 2024-03-13

## 2024-07-08 RX ORDER — ACETAMINOPHEN AND CODEINE PHOSPHATE 120; 12 MG/5ML; MG/5ML
1 SOLUTION ORAL DAILY
Qty: 90 TABLET | Refills: 1 | Status: SHIPPED | OUTPATIENT
Start: 2024-07-08

## 2024-08-07 ENCOUNTER — HOSPITAL ENCOUNTER (OUTPATIENT)
Dept: GENERAL RADIOLOGY | Age: 29
Discharge: HOME | End: 2024-08-07
Payer: COMMERCIAL

## 2024-08-07 ENCOUNTER — OFFICE VISIT (OUTPATIENT)
Dept: FAMILY MEDICINE CLINIC | Age: 29
End: 2024-08-07
Payer: COMMERCIAL

## 2024-08-07 VITALS
WEIGHT: 240 LBS | DIASTOLIC BLOOD PRESSURE: 70 MMHG | SYSTOLIC BLOOD PRESSURE: 110 MMHG | BODY MASS INDEX: 45.35 KG/M2 | OXYGEN SATURATION: 98 % | HEART RATE: 85 BPM

## 2024-08-07 DIAGNOSIS — B37.31 VAGINAL CANDIDIASIS: Primary | ICD-10-CM

## 2024-08-07 DIAGNOSIS — F41.1 GAD (GENERALIZED ANXIETY DISORDER): ICD-10-CM

## 2024-08-07 DIAGNOSIS — G89.29 CHRONIC MIDLINE LOW BACK PAIN WITH RIGHT-SIDED SCIATICA: ICD-10-CM

## 2024-08-07 DIAGNOSIS — M54.41 CHRONIC MIDLINE LOW BACK PAIN WITH RIGHT-SIDED SCIATICA: ICD-10-CM

## 2024-08-07 DIAGNOSIS — M79.671 RIGHT FOOT PAIN: ICD-10-CM

## 2024-08-07 DIAGNOSIS — R30.0 BURNING WITH URINATION: ICD-10-CM

## 2024-08-07 LAB
BILIRUBIN, POC: NORMAL
BLOOD URINE, POC: NORMAL
CLARITY, POC: CLEAR
COLOR, POC: YELLOW
GLUCOSE URINE, POC: NORMAL
KETONES, POC: NORMAL
LEUKOCYTE EST, POC: NORMAL
NITRITE, POC: NORMAL
PH, POC: 5.5
PROTEIN, POC: NORMAL
SPECIFIC GRAVITY, POC: 1.01
UROBILINOGEN, POC: 0.2

## 2024-08-07 PROCEDURE — 81002 URINALYSIS NONAUTO W/O SCOPE: CPT | Performed by: PHYSICIAN ASSISTANT

## 2024-08-07 PROCEDURE — 99214 OFFICE O/P EST MOD 30 MIN: CPT | Performed by: PHYSICIAN ASSISTANT

## 2024-08-07 PROCEDURE — 72100 X-RAY EXAM L-S SPINE 2/3 VWS: CPT

## 2024-08-07 RX ORDER — FLUCONAZOLE 150 MG/1
150 TABLET ORAL ONCE
Qty: 1 TABLET | Refills: 0 | Status: SHIPPED | OUTPATIENT
Start: 2024-08-07 | End: 2024-08-07

## 2024-08-07 RX ORDER — CITALOPRAM 20 MG/1
20 TABLET ORAL DAILY
Qty: 90 TABLET | Refills: 1 | Status: SHIPPED | OUTPATIENT
Start: 2024-08-07

## 2024-08-07 ASSESSMENT — PATIENT HEALTH QUESTIONNAIRE - PHQ9
1. LITTLE INTEREST OR PLEASURE IN DOING THINGS: NOT AT ALL
5. POOR APPETITE OR OVEREATING: NOT AT ALL
10. IF YOU CHECKED OFF ANY PROBLEMS, HOW DIFFICULT HAVE THESE PROBLEMS MADE IT FOR YOU TO DO YOUR WORK, TAKE CARE OF THINGS AT HOME, OR GET ALONG WITH OTHER PEOPLE: NOT DIFFICULT AT ALL
3. TROUBLE FALLING OR STAYING ASLEEP: NOT AT ALL
SUM OF ALL RESPONSES TO PHQ QUESTIONS 1-9: 0
4. FEELING TIRED OR HAVING LITTLE ENERGY: NOT AT ALL
SUM OF ALL RESPONSES TO PHQ QUESTIONS 1-9: 0
SUM OF ALL RESPONSES TO PHQ QUESTIONS 1-9: 0
7. TROUBLE CONCENTRATING ON THINGS, SUCH AS READING THE NEWSPAPER OR WATCHING TELEVISION: NOT AT ALL
DEPRESSION UNABLE TO ASSESS: FUNCTIONAL CAPACITY MOTIVATION LIMITS ACCURACY
9. THOUGHTS THAT YOU WOULD BE BETTER OFF DEAD, OR OF HURTING YOURSELF: NOT AT ALL
8. MOVING OR SPEAKING SO SLOWLY THAT OTHER PEOPLE COULD HAVE NOTICED. OR THE OPPOSITE, BEING SO FIGETY OR RESTLESS THAT YOU HAVE BEEN MOVING AROUND A LOT MORE THAN USUAL: NOT AT ALL
SUM OF ALL RESPONSES TO PHQ QUESTIONS 1-9: 0
6. FEELING BAD ABOUT YOURSELF - OR THAT YOU ARE A FAILURE OR HAVE LET YOURSELF OR YOUR FAMILY DOWN: NOT AT ALL
2. FEELING DOWN, DEPRESSED OR HOPELESS: NOT AT ALL
SUM OF ALL RESPONSES TO PHQ9 QUESTIONS 1 & 2: 0

## 2024-08-07 ASSESSMENT — ENCOUNTER SYMPTOMS
BACK PAIN: 1
COLOR CHANGE: 0

## 2024-08-07 NOTE — PROGRESS NOTES
Aleida Feliz (:  1995) is a 28 y.o. female,Established patient, here for evaluation of the following chief complaint(s):  Foot Pain (Right foot, States its been going on since 2024 but it's been bearable but this past week the pain is radiating up the ankle and limping. Went to a podiatrist in  and they told her everything was fine ) and Yeast Infection (Used OTC test and it was positive, states the itching is better but having some burning with urination )      Assessment & Plan   1. Vaginal candidiasis  -     fluconazole (DIFLUCAN) 150 MG tablet; Take 1 tablet by mouth once for 1 dose, Disp-1 tablet, R-0Normal  2. Burning with urination  -     POCT Urinalysis no Micro: neg for signs of infection  3. Chronic midline low back pain with right-sided sciatica  -     XR LUMBAR SPINE (2-3 VIEWS); Future  4. Right foot pain       -  Possibly issue with L5-S1, will get lumbar imaging, if normal, we can consider an MRI of her foot. Ice, rest and nsaids.  5. SILVINA (generalized anxiety disorder)  -     citalopram (CELEXA) 20 MG tablet; Take 1 tablet by mouth daily, Disp-90 tablet, R-1Normal  -    well controlled, continue with celexa, follow up in 1 year      Subjective   HPI  Foot pain:  Timin months ago  Location: outside of the foot, radiates into her ankle  Characteristics: dull aching, can be sharp with pressure  Associated symptoms: mild swelling  Denies: temperature change, color change  Tx: none  She saw podiatry in , they took xrays and there was nothing  She is still breastfeeding    SILVINA:  Current medication: Celexa  Side effects: none  Residual symptoms: irritable, mild anxiety  Denies: SI/HI,     Vaginal Complaints  Tried monistat and it was helpful  Continues to have vaginal itching    Review of Systems   Genitourinary:  Negative for decreased urine volume, difficulty urinating, dyspareunia, dysuria, flank pain, frequency, hematuria, pelvic pain, urgency, vaginal bleeding, vaginal

## 2024-08-09 ENCOUNTER — TELEPHONE (OUTPATIENT)
Dept: FAMILY MEDICINE CLINIC | Age: 29
End: 2024-08-09

## 2024-08-09 RX ORDER — METHYLPREDNISOLONE 4 MG/1
TABLET ORAL
Qty: 1 KIT | Refills: 0 | Status: SHIPPED | OUTPATIENT
Start: 2024-08-09

## 2024-08-09 NOTE — TELEPHONE ENCOUNTER
Called to let patient know Sheila sent in a steroid for her to pharmacy. , can you help schedule her for PT here at our office? Thanks.

## 2024-09-27 ENCOUNTER — OFFICE VISIT (OUTPATIENT)
Dept: FAMILY MEDICINE CLINIC | Age: 29
End: 2024-09-27

## 2024-09-27 VITALS
WEIGHT: 243 LBS | BODY MASS INDEX: 45.91 KG/M2 | OXYGEN SATURATION: 98 % | SYSTOLIC BLOOD PRESSURE: 104 MMHG | HEART RATE: 76 BPM | DIASTOLIC BLOOD PRESSURE: 60 MMHG

## 2024-09-27 DIAGNOSIS — G89.29 CHRONIC PAIN IN RIGHT FOOT: Primary | ICD-10-CM

## 2024-09-27 DIAGNOSIS — M79.671 CHRONIC PAIN IN RIGHT FOOT: Primary | ICD-10-CM

## 2024-09-27 SDOH — ECONOMIC STABILITY: FOOD INSECURITY: WITHIN THE PAST 12 MONTHS, THE FOOD YOU BOUGHT JUST DIDN'T LAST AND YOU DIDN'T HAVE MONEY TO GET MORE.: NEVER TRUE

## 2024-09-27 SDOH — ECONOMIC STABILITY: FOOD INSECURITY: WITHIN THE PAST 12 MONTHS, YOU WORRIED THAT YOUR FOOD WOULD RUN OUT BEFORE YOU GOT MONEY TO BUY MORE.: NEVER TRUE

## 2024-09-27 SDOH — ECONOMIC STABILITY: INCOME INSECURITY: HOW HARD IS IT FOR YOU TO PAY FOR THE VERY BASICS LIKE FOOD, HOUSING, MEDICAL CARE, AND HEATING?: NOT VERY HARD

## 2024-09-27 ASSESSMENT — ENCOUNTER SYMPTOMS: COLOR CHANGE: 0

## 2024-11-06 ENCOUNTER — TELEPHONE (OUTPATIENT)
Dept: ORTHOPEDIC SURGERY | Age: 29
End: 2024-11-06

## 2024-11-06 ENCOUNTER — TELEPHONE (OUTPATIENT)
Dept: FAMILY MEDICINE CLINIC | Age: 29
End: 2024-11-06

## 2024-11-06 DIAGNOSIS — M84.376A STRESS FRACTURE OF FOOT, UNSPECIFIED LATERALITY, INITIAL ENCOUNTER: Primary | ICD-10-CM

## 2024-11-06 NOTE — TELEPHONE ENCOUNTER
Appointment Request     Patient requesting earlier appointment: Yes  Appointment offered to patient: NP R FOOT /XR MRI FX   Patient Contact Number: [phone]

## 2024-11-06 NOTE — TELEPHONE ENCOUNTER
Patient called into the office asking for results. Information was provided. Patient would like to follow up with the foot doctor.

## 2024-11-06 NOTE — TELEPHONE ENCOUNTER
Patient returned call, nurse provided patient with phone number and address for referral.  Patient verbalized understanding.

## 2024-11-06 NOTE — TELEPHONE ENCOUNTER
Please call the patient with the following results: MRI shows a stress fracture of the 4th metatarsal. No issues with soft tissue noted. Would she like to see our foot specialist, Dr. Su for follow up?

## 2024-11-07 ENCOUNTER — OFFICE VISIT (OUTPATIENT)
Dept: ORTHOPEDIC SURGERY | Age: 29
End: 2024-11-07

## 2024-11-07 VITALS — BODY MASS INDEX: 45.88 KG/M2 | HEIGHT: 61 IN | WEIGHT: 243 LBS

## 2024-11-07 DIAGNOSIS — M79.671 RIGHT FOOT PAIN: Primary | ICD-10-CM

## 2024-11-07 DIAGNOSIS — M89.8X7 EXOSTOSIS OF FOOT: ICD-10-CM

## 2024-11-07 RX ORDER — NAPROXEN 500 MG/1
500 TABLET ORAL 2 TIMES DAILY WITH MEALS
Qty: 60 TABLET | Refills: 0 | Status: SHIPPED | OUTPATIENT
Start: 2024-11-07 | End: 2024-12-07

## 2024-11-07 NOTE — PROGRESS NOTES
CHIEF COMPLAINT: Right small toe pain/ 5th MT head exostosis.    HISTORY:  Ms. Feliz 29 y.o.  female presents today for consultation request from Sheila Yoder PA for evaluation of left small toe pain which started Feb 2024 after she went back to work as  after Maternity leave. She saw different Podiatrist in June and July 2024. She is complaining of sharp pain, 4/10.  Pain is increase with standing and walking and shoe wear. Pain is sharp early in the morning with first few steps, dull achy pain by the end of the day. No radiation and no numbness and tingling sensation. No other complaint.  No h/o trauma or gout.    Examination of the gait, showed that the patient walks heel-toe with a non-antalgic gait and no limp.  Examination of both ankles showing a good range of motion.  She has dorsiflexion to about 5 degrees bilaterally, which increased with knee flexion. She has intact sensation and good pedal pulses.  She has good strength in all four planes, including eversion, and has tenderness on deep palpation over the small toe MPJ, with prominent 5th MT head and bunionette deformity.  The ankles are stable to drawer test bilaterally, equally.  Ankle reflex 1+ bilaterally.     IMAGING:  Xray's were reviewed.  3 views of the right foot taken in office today, and showed no acute fracture. There is a prominent 5th MT head exostosis and bunionette deformity.    MRI right foot dated 11/5/2024 was reviewed and showed;        IMPRESSION:  Right small toe pain/ 5th MT head exostosis.      PLAN: I discussed with the patient the MRI and treatment options including both surgical and non-surgical treatment.  We recommended stretching exercises of the calf which was taught to the patient today. She will take NSAIDS Naprosyn. Use widef shoes. I believe she may benefit from surgical treatment with partial excision 5th MT head exostosis if pain did not improve. F/U PRN.    Thank you very much for the

## 2024-11-30 ENCOUNTER — OFFICE VISIT (OUTPATIENT)
Age: 29
End: 2024-11-30

## 2024-11-30 VITALS
HEART RATE: 108 BPM | OXYGEN SATURATION: 98 % | SYSTOLIC BLOOD PRESSURE: 116 MMHG | DIASTOLIC BLOOD PRESSURE: 60 MMHG | TEMPERATURE: 97.1 F

## 2024-11-30 DIAGNOSIS — H66.90 ACUTE OTITIS MEDIA, UNSPECIFIED OTITIS MEDIA TYPE: Primary | ICD-10-CM

## 2024-11-30 DIAGNOSIS — J02.9 PHARYNGITIS, UNSPECIFIED ETIOLOGY: ICD-10-CM

## 2024-11-30 DIAGNOSIS — J02.9 SORE THROAT: ICD-10-CM

## 2024-11-30 LAB
Lab: NORMAL
PERFORMING INSTRUMENT: NORMAL
QC PASS/FAIL: NORMAL
S PYO AG THROAT QL: NORMAL
SARS-COV-2, POC: NORMAL

## 2024-11-30 RX ORDER — AMOXICILLIN 875 MG/1
875 TABLET, COATED ORAL 2 TIMES DAILY
Qty: 14 TABLET | Refills: 0 | Status: SHIPPED | OUTPATIENT
Start: 2024-11-30 | End: 2024-11-30

## 2024-11-30 RX ORDER — AMOXICILLIN 875 MG/1
875 TABLET, COATED ORAL 2 TIMES DAILY
Qty: 14 TABLET | Refills: 0 | Status: SHIPPED | OUTPATIENT
Start: 2024-11-30 | End: 2024-12-07

## 2024-11-30 NOTE — PROGRESS NOTES
Aleida Feliz (:  1995) is a 29 y.o. female,  here for evaluation of the following chief complaint(s): Pharyngitis (Pt states she has sore throat/Pt states started yesterday/Pt states swollen lymph nodes in neck and under left arm pit)    Aleida Feliz is a: New patient.   Last Urgent Care Visit: Visit date not found  I have reviewed the patient's medications and basic medical history; see Medication Reconciliation.    ASSESSMENT/PLAN:  Diagnosis:     ICD-10-CM    1. Acute otitis media, unspecified otitis media type  H66.90       2. Sore throat  J02.9 POCT rapid strep A     POCT COVID-19, Antigen      3. Pharyngitis, unspecified etiology  J02.9              Medical Decision Making:   Patient was seen at Urgent Care today for sinus/nasal congestion; sore throat: x 2 day(s)   Pt does not have significant respiratory history:      On presentation, pt appears well. They are afrebile, not hypoxic or in any respiratory distress.   Lungs clear on auscultation. Oropharynx with mild erythema only. No tonsillar hypertrophy or exudate. There is mild erythema to left TM and pt does complain of left ear pain since earlier today.     Strep test was obtained and was negative.    COVID was NEGATIVE;   Left TM is dull with some erythema and bulging consistent with otitis.   Pt will be treated with amoxicillin. She is breastfeeding and this is considered generally safe.     Prescription(s) provided: Amoxicillin;    Patient was discharged home with follow-up and return precautions.    Patient did not have elevated blood pressure greater than 130/80. Therefore, referral to PCP for HTN is not indicated      Results:  Results for orders placed or performed in visit on 24   POCT rapid strep A   Result Value Ref Range    Strep A Ag None Detected None Detected   POCT COVID-19, Antigen   Result Value Ref Range    SARS-COV-2, POC Not-Detected Not Detected    Lot Number [id number]     QC Pass/Fail pass     Performing Instrument

## 2024-12-20 RX ORDER — NORETHINDRONE 0.35 MG/1
1 TABLET ORAL DAILY
Qty: 84 TABLET | Refills: 1 | OUTPATIENT
Start: 2024-12-20

## 2024-12-23 NOTE — TELEPHONE ENCOUNTER
Pt called office regarding refill request. She has scheduled annual . Pt also stated insurance will not cover    JENCYCLA  generic needed. Please sign or advise

## 2024-12-30 NOTE — TELEPHONE ENCOUNTER
Called pharm. Placed verbal order for   JENCYCLA 0.35 MG tablet [Pharmacy Med Name: JENCYCLA 0.35 MG TABLET] [[id number]]  Dispense 84 w/ 1 refill. This medication is the generic med. Costing pt $18 a month.      Once pharmacist ran verbal order she stated pt must've had formulary change because med is free now for pt.     Called pt and made her aware. Done.

## 2025-02-12 ENCOUNTER — OFFICE VISIT (OUTPATIENT)
Dept: OBGYN CLINIC | Age: 30
End: 2025-02-12

## 2025-02-12 VITALS
WEIGHT: 237.6 LBS | DIASTOLIC BLOOD PRESSURE: 76 MMHG | SYSTOLIC BLOOD PRESSURE: 112 MMHG | BODY MASS INDEX: 44.89 KG/M2 | HEART RATE: 76 BPM | OXYGEN SATURATION: 98 %

## 2025-02-12 DIAGNOSIS — Z12.4 PAP SMEAR FOR CERVICAL CANCER SCREENING: ICD-10-CM

## 2025-02-12 DIAGNOSIS — Z98.891 HISTORY OF CESAREAN SECTION: ICD-10-CM

## 2025-02-12 DIAGNOSIS — Z01.419 WOMEN'S ANNUAL ROUTINE GYNECOLOGICAL EXAMINATION: Primary | ICD-10-CM

## 2025-02-13 ENCOUNTER — OFFICE VISIT (OUTPATIENT)
Age: 30
End: 2025-02-13

## 2025-02-13 VITALS
WEIGHT: 234 LBS | BODY MASS INDEX: 44.21 KG/M2 | OXYGEN SATURATION: 98 % | DIASTOLIC BLOOD PRESSURE: 77 MMHG | TEMPERATURE: 98.4 F | SYSTOLIC BLOOD PRESSURE: 110 MMHG | HEART RATE: 88 BPM

## 2025-02-13 DIAGNOSIS — H04.301 DACRYOCYSTITIS OF RIGHT LACRIMAL SAC: Primary | ICD-10-CM

## 2025-02-13 RX ORDER — AMOXICILLIN AND CLAVULANATE POTASSIUM 600; 42.9 MG/5ML; MG/5ML
600 POWDER, FOR SUSPENSION ORAL 2 TIMES DAILY
Qty: 100 ML | Refills: 0 | Status: SHIPPED | OUTPATIENT
Start: 2025-02-13 | End: 2025-02-13 | Stop reason: CLARIF

## 2025-02-13 ASSESSMENT — ENCOUNTER SYMPTOMS
FOREIGN BODY SENSATION: 0
DOUBLE VISION: 0
VOMITING: 0
EYE ITCHING: 0
BLURRED VISION: 1
NAUSEA: 0
EYE PAIN: 1
EYE DISCHARGE: 0
PHOTOPHOBIA: 0
EYE REDNESS: 0

## 2025-02-13 NOTE — PROGRESS NOTES
Aleida Feliz (: 1995) is a 29 y.o. female, here for evaluation of the following chief complaint(s): Eye Pain (Pt stated 3 days )    Aleida Feliz is a: New patient.   Last Urgent Care Visit: Visit date not found   I have reviewed the patient's medications and basic medical history; see Medication Reconciliation.    ASSESSMENT/PLAN:    ICD-10-CM    1. Dacryocystitis of right lacrimal sac  H04.301           Suspect dacryocysitis 2/2 nasolacrimal duct blockage, patient has symptoms of sinus pressure, maxillary sinus pain, and tearing, will treat with augmentin  No concerns for corneal abrasions, retained foreign objects within the eye(s), blepharitis, hordeolum, facial, orbital, or periorbital cellulitis, ocular trauma, and acute angle glaucoma  Can use warm, moist compresses for relief of the eye discomfort, along with for use of gently wiping away any collecting eye discharge  Follow up with ophthalmology for persistent symptoms  Strict ED follow up instructions for any worsening symptoms  Discussed PCP follow up for persisting or worsening symptoms, or to return to the clinic if unable to obtain PCP follow up for worsening symptoms  The patient and/or the family were informed of the results of any tests, a time was given to answer questions, a plan was proposed and they agreed with plan. Reviewed AVS with treatment instructions and answered questions - pt/family expresses understanding and agreement with the discussed treatment plan and AVS instructions.  Patient did not have elevated blood pressure greater than 130/80. Therefore, referral to PCP for HTN is not indicated    SUBJECTIVE/OBJECTIVE    Eye Pain   The right eye is affected. This is a new problem. The current episode started in the past 7 days (3 days ago). The problem occurs intermittently. The problem has been unchanged. There was no injury mechanism. The pain is mild (tenderness of lower lash/inner corner). There is No known exposure

## 2025-02-13 NOTE — PATIENT INSTRUCTIONS
Aleida,    Thank you for trusting [facility] with your care. Your decision to come to us means a lot and we are honored to be part of your healthcare journey. We value your trust and hope your experience with us was positive and met your expectations.    We're always looking for ways to improve, and your feedback is incredibly important to us. You will receive a text or email soon asking you how your visit went. for If you could take a moment to share your thoughts, it would mean the world to us. Your input helps us better serve you and others in the community.     Thank you again for choosing us. We're grateful for the opportunity to care for you and your loved ones. We hope to see you again - though we always wish you health and wellness!    Warm regards,    [facility]    Sheila Hernandez PA-C, Nestor, Clinic Supervisor, and Anat RT    Take antibiotics as directed  Recommend warm compresses over the eye for 10-15 minutes, several times per day - the typical recommendation is 4 times per day  Eyelid massages after the compresses can also help promote drainage of the blocked duct  If symptoms persist or show signs of worsening, follow up with an eye doctor for further evaluation  If you develop fevers, chills, facial swelling, pain with eye movement, eye bulging, or changes in your vision, follow up with the nearest Emergency Department for further evaluation.

## 2025-02-22 PROBLEM — Z98.891 HISTORY OF CESAREAN SECTION: Status: ACTIVE | Noted: 2025-02-22

## 2025-02-22 RX ORDER — ACETAMINOPHEN AND CODEINE PHOSPHATE 120; 12 MG/5ML; MG/5ML
1 SOLUTION ORAL DAILY
Qty: 90 TABLET | Refills: 3 | Status: SHIPPED | OUTPATIENT
Start: 2025-02-22

## 2025-02-22 ASSESSMENT — ENCOUNTER SYMPTOMS
SHORTNESS OF BREATH: 0
ABDOMINAL PAIN: 0
CONSTIPATION: 0
ABDOMINAL DISTENTION: 0
DIARRHEA: 0
VOMITING: 0
NAUSEA: 0

## 2025-03-11 DIAGNOSIS — F41.1 GAD (GENERALIZED ANXIETY DISORDER): ICD-10-CM

## 2025-03-11 RX ORDER — CITALOPRAM HYDROBROMIDE 20 MG/1
20 TABLET ORAL DAILY
Qty: 90 TABLET | Refills: 1 | Status: SHIPPED | OUTPATIENT
Start: 2025-03-11

## 2025-03-11 NOTE — TELEPHONE ENCOUNTER
Refill Request     CONFIRM preferred pharmacy with the patient.    If Mail Order Rx - Pend for 90 day refill.      Last Seen: Last Seen Department: 9/27/2024  Last Seen by PCP: 9/27/2024    Last Written: 8/7/24 #90 - 1 refill     If no future appointment scheduled:  Review the last OV with PCP and review information for follow-up visit,  Route STAFF MESSAGE with patient name to the  Pool for scheduling with the following information:            -  Timing of next visit           -  Visit type ie Physical, OV, etc           -  Diagnoses/Reason ie. COPD, HTN - Do not use MEDICATION, Follow-up or CHECK UP - Give reason for visit      Next Appointment:   No future appointments.    Message sent to  to schedule appt with patient?  YES Yearly follow up due 8/7/25      Requested Prescriptions     Pending Prescriptions Disp Refills    citalopram (CELEXA) 20 MG tablet [Pharmacy Med Name: CITALOPRAM HBR 20 MG TABLET] 90 tablet 1     Sig: TAKE 1 TABLET BY MOUTH EVERY DAY

## (undated) DEVICE — DRESSING COMP IS W4XL10IN PD W2XL8IN CNTCT LAYR ADH

## (undated) DEVICE — SUTURE VCRL SZ 1 L36IN ABSRB VLT L36MM CT-1 1/2 CIR J347H

## (undated) DEVICE — Device

## (undated) DEVICE — SOLUTION IV IRRIG POUR BRL 0.9% SODIUM CHL 2F7124

## (undated) DEVICE — PAD,NON-ADHERENT,3X8,STERILE,LF,1/PK: Brand: MEDLINE

## (undated) DEVICE — GARMENT COMPR L FOR 23IN CALF FLOTRN

## (undated) DEVICE — 3M™ STERI-STRIP™ COMPOUND BENZOIN TINCTURE 40 BAGS/CARTON 4 CARTONS/CASE C1544: Brand: 3M™ STERI-STRIP™

## (undated) DEVICE — SUTURE VCRL SZ 3-0 L36IN ABSRB UD L36MM CT-1 1/2 CIR J944H

## (undated) DEVICE — SUTURE VCRL SZ 0 L36IN ABSRB UD L36MM CT-1 1/2 CIR J946H

## (undated) DEVICE — GLOVE SURG SZ 6 THK91MIL LTX FREE SYN POLYISOPRENE ANTI

## (undated) DEVICE — Z INACTIVE NO ACTIVE SUPPLIER APPLICATOR MEDICATED 26 CC TINT HI-LITE ORNG STRL CHLORAPREP

## (undated) DEVICE — GLOVE SURG SZ 65 THK91MIL LTX FREE SYN POLYISOPRENE

## (undated) DEVICE — SUTURE MCRYL SZ 3-0 L27IN ABSRB UD L60MM KS STR REV CUT Y523H

## (undated) DEVICE — BLADE CLIPPER GEN PURP NS

## (undated) DEVICE — SUTURE ABSORBABLE BRAIDED 2-0 CT-1 27 IN UD VICRYL J259H

## (undated) DEVICE — S/USE RESUS KIT W/O MASK (10): Brand: FISHER & PAYKEL HEALTHCARE

## (undated) DEVICE — TRAY URIN CATH 16FR DRNGE BG STATLOK STBL DEV F SURSTP